# Patient Record
Sex: MALE | Race: WHITE | NOT HISPANIC OR LATINO | Employment: FULL TIME | URBAN - METROPOLITAN AREA
[De-identification: names, ages, dates, MRNs, and addresses within clinical notes are randomized per-mention and may not be internally consistent; named-entity substitution may affect disease eponyms.]

---

## 2017-09-26 ENCOUNTER — OFFICE VISIT (OUTPATIENT)
Dept: FAMILY MEDICINE CLINIC | Facility: CLINIC | Age: 37
End: 2017-09-26

## 2017-09-26 VITALS
OXYGEN SATURATION: 97 % | HEIGHT: 73 IN | SYSTOLIC BLOOD PRESSURE: 118 MMHG | BODY MASS INDEX: 37.67 KG/M2 | HEART RATE: 91 BPM | DIASTOLIC BLOOD PRESSURE: 84 MMHG | TEMPERATURE: 98.2 F | WEIGHT: 284.2 LBS

## 2017-09-26 DIAGNOSIS — Z00.00 ROUTINE ADULT HEALTH MAINTENANCE: ICD-10-CM

## 2017-09-26 DIAGNOSIS — B35.1 FUNGAL INFECTION OF NAIL: ICD-10-CM

## 2017-09-26 DIAGNOSIS — J30.1 NON-SEASONAL ALLERGIC RHINITIS DUE TO POLLEN: Primary | ICD-10-CM

## 2017-09-26 DIAGNOSIS — J06.9 ACUTE URI: ICD-10-CM

## 2017-09-26 PROBLEM — F41.9 ANXIETY DISORDER: Status: ACTIVE | Noted: 2017-09-26

## 2017-09-26 PROBLEM — F41.0 PANIC ATTACK: Status: ACTIVE | Noted: 2017-09-26

## 2017-09-26 PROCEDURE — 99214 OFFICE O/P EST MOD 30 MIN: CPT | Performed by: FAMILY MEDICINE

## 2017-09-26 RX ORDER — LORATADINE 10 MG/1
CAPSULE, LIQUID FILLED ORAL
COMMUNITY
End: 2022-04-11

## 2017-09-26 RX ORDER — MOMETASONE FUROATE 50 UG/1
1 SPRAY, METERED NASAL 2 TIMES DAILY
Qty: 1 EACH | Refills: 5 | Status: SHIPPED | OUTPATIENT
Start: 2017-09-26 | End: 2018-02-12

## 2017-09-26 RX ORDER — TERBINAFINE HYDROCHLORIDE 250 MG/1
250 TABLET ORAL DAILY
Qty: 30 TABLET | Refills: 2 | Status: SHIPPED | OUTPATIENT
Start: 2017-09-26 | End: 2018-01-17

## 2017-09-26 RX ORDER — AMOXICILLIN 500 MG/1
500 TABLET, FILM COATED ORAL 3 TIMES DAILY
Qty: 21 TABLET | Refills: 0 | Status: SHIPPED | OUTPATIENT
Start: 2017-09-26 | End: 2017-10-03

## 2017-09-26 NOTE — PROGRESS NOTES
"  Chief Complaint   Patient presents with   • Cough     x 3 weeks    • Wheezing   • Nasal Congestion   • Allergies   • Nail Problem       Upper Respiratory Infection: Patient complains of symptoms of a URI. Symptoms include congestion and cough. Onset of symptoms was a few weeks ago, unchanged since that time. He also c/o congestion, nasal congestion, non productive cough, sinus pressure, sneezing and wheezing for the past 2 weeks .  He is drinking plenty of fluids. Evaluation to date: none. Treatment to date: cough suppressants.  Ill contacts at home or school or work discussed.    Overall allergies are worse , past few years.  Would like a referral to Allergy.    Bilateral thumb nail , thinning.  Lamisil in past helped only some  OTC topicals some help.      Would like routine labs checked    Vitals:    09/26/17 1553   BP: 118/84   BP Location: Left arm   Patient Position: Sitting   Pulse: 91   Temp: 98.2 °F (36.8 °C)   SpO2: 97%   Weight: 284 lb 3.2 oz (129 kg)   Height: 72.5\" (184.2 cm)     Gen: well appearing, alert  Ears: Tm's bulging without redness  Nose:  Congestion  Throat:   without exudate, some drainage, tonsils okay  Neck: no LAD  Lung: mild rales, good air movement, regular RR  Heart: RR without murmur  Skin: no rash.  Nails; thumbs thin, wavy      Assessment/Plan   Jose was seen today for cough, wheezing, nasal congestion, allergies and nail problem.    Diagnoses and all orders for this visit:    Non-seasonal allergic rhinitis due to pollen  -     mometasone (NASONEX) 50 MCG/ACT nasal spray; 1 spray into each nostril 2 (Two) Times a Day. Indications: Hayfever  -     Ambulatory Referral to Allergy    Acute URI  -     amoxicillin (AMOXIL) 500 MG tablet; Take 1 tablet by mouth 3 (Three) Times a Day for 7 days.    Routine adult health maintenance  -     Lipid Panel; Future  -     CBC (No Diff); Future  -     Comprehensive Metabolic Panel; Future    Fungal infection of nail  -     Efinaconazole " (JUBLIA) 10 % solution; Apply 1 Units topically Daily.    Other orders  -     terbinafine (LAMISIL) 250 MG tablet; Take 1 tablet by mouth Daily. Indications: Fungal Fingernail Disease             There are no Patient Instructions on file for this visit.    Tylenol or Advil as needed for pain, fever, muscle aches  Plenty of fluids  Hand washing discussed  Off work or school note given if needed.  Warm tea for throat.  Pros and cons of antibiotic use discussed    Dr. Jose Pham MD  [place], [place]  [facility]

## 2017-09-27 ENCOUNTER — TELEPHONE (OUTPATIENT)
Dept: FAMILY MEDICINE CLINIC | Facility: CLINIC | Age: 37
End: 2017-09-27

## 2017-09-27 NOTE — TELEPHONE ENCOUNTER
Elías called and said that they need more specific instructions on the Jublia. How many drops to apply?

## 2017-10-01 ENCOUNTER — RESULTS ENCOUNTER (OUTPATIENT)
Dept: FAMILY MEDICINE CLINIC | Facility: CLINIC | Age: 37
End: 2017-10-01

## 2017-10-01 DIAGNOSIS — Z00.00 ROUTINE ADULT HEALTH MAINTENANCE: ICD-10-CM

## 2017-10-05 LAB
ALBUMIN SERPL-MCNC: 4.5 G/DL (ref 3.5–5.2)
ALBUMIN/GLOB SERPL: 1.9 G/DL
ALP SERPL-CCNC: 89 U/L (ref 40–129)
ALT SERPL-CCNC: 52 U/L (ref 5–41)
AST SERPL-CCNC: 29 U/L (ref 5–40)
BILIRUB SERPL-MCNC: 0.6 MG/DL (ref 0.2–1.2)
BUN SERPL-MCNC: 13 MG/DL (ref 6–20)
BUN/CREAT SERPL: 14.1 (ref 7–25)
CALCIUM SERPL-MCNC: 9.2 MG/DL (ref 8.6–10.5)
CHLORIDE SERPL-SCNC: 103 MMOL/L (ref 98–107)
CHOLEST SERPL-MCNC: 171 MG/DL (ref 0–200)
CO2 SERPL-SCNC: 23.6 MMOL/L (ref 22–29)
CREAT SERPL-MCNC: 0.92 MG/DL (ref 0.76–1.27)
ERYTHROCYTE [DISTWIDTH] IN BLOOD BY AUTOMATED COUNT: 13.2 % (ref 11.5–14.5)
GLOBULIN SER CALC-MCNC: 2.4 GM/DL
GLUCOSE SERPL-MCNC: 92 MG/DL (ref 65–99)
HCT VFR BLD AUTO: 44.7 % (ref 42–52)
HDLC SERPL-MCNC: 50 MG/DL (ref 40–60)
HGB BLD-MCNC: 15.5 G/DL (ref 14–18)
LDLC SERPL CALC-MCNC: 86 MG/DL (ref 0–100)
MCH RBC QN AUTO: 31.3 PG (ref 27–31)
MCHC RBC AUTO-ENTMCNC: 34.7 G/DL (ref 31–37)
MCV RBC AUTO: 90.3 FL (ref 80–94)
PLATELET # BLD AUTO: 345 10*3/MM3 (ref 140–500)
POTASSIUM SERPL-SCNC: 4.9 MMOL/L (ref 3.5–5.2)
PROT SERPL-MCNC: 6.9 G/DL (ref 6–8.5)
RBC # BLD AUTO: 4.95 10*6/MM3 (ref 4.7–6.1)
SODIUM SERPL-SCNC: 140 MMOL/L (ref 136–145)
TRIGL SERPL-MCNC: 175 MG/DL (ref 0–150)
VLDLC SERPL CALC-MCNC: 35 MG/DL (ref 8–32)
WBC # BLD AUTO: 9.36 10*3/MM3 (ref 4.8–10.8)

## 2018-01-17 ENCOUNTER — TELEPHONE (OUTPATIENT)
Dept: FAMILY MEDICINE CLINIC | Facility: CLINIC | Age: 38
End: 2018-01-17

## 2018-01-17 RX ORDER — ITRACONAZOLE 100 MG/1
200 CAPSULE ORAL DAILY
Qty: 60 CAPSULE | Refills: 2 | Status: SHIPPED | OUTPATIENT
Start: 2018-01-17 | End: 2018-01-18

## 2018-01-18 RX ORDER — KETOCONAZOLE 200 MG/1
200 TABLET ORAL DAILY
Qty: 30 TABLET | Refills: 2 | Status: SHIPPED | OUTPATIENT
Start: 2018-01-18 | End: 2018-02-12 | Stop reason: SDUPTHER

## 2018-02-12 ENCOUNTER — OFFICE VISIT (OUTPATIENT)
Dept: FAMILY MEDICINE CLINIC | Facility: CLINIC | Age: 38
End: 2018-02-12

## 2018-02-12 VITALS
SYSTOLIC BLOOD PRESSURE: 132 MMHG | HEART RATE: 100 BPM | HEIGHT: 73 IN | TEMPERATURE: 98.1 F | DIASTOLIC BLOOD PRESSURE: 86 MMHG | BODY MASS INDEX: 38.97 KG/M2 | WEIGHT: 294 LBS | OXYGEN SATURATION: 98 %

## 2018-02-12 DIAGNOSIS — R09.1 PLEURISY: Primary | ICD-10-CM

## 2018-02-12 DIAGNOSIS — B35.1 FUNGAL INFECTION OF NAIL: ICD-10-CM

## 2018-02-12 PROCEDURE — 99213 OFFICE O/P EST LOW 20 MIN: CPT | Performed by: FAMILY MEDICINE

## 2018-02-12 RX ORDER — KETOCONAZOLE 200 MG/1
200 TABLET ORAL DAILY
Qty: 30 TABLET | Refills: 1 | Status: SHIPPED | OUTPATIENT
Start: 2018-02-12 | End: 2022-04-11

## 2018-02-12 RX ORDER — PREDNISONE 10 MG/1
10 TABLET ORAL 2 TIMES DAILY
Qty: 14 TABLET | Refills: 0 | Status: SHIPPED | OUTPATIENT
Start: 2018-02-12 | End: 2018-04-24

## 2018-02-12 RX ORDER — AMOXICILLIN 500 MG/1
500 TABLET, FILM COATED ORAL 3 TIMES DAILY
Qty: 21 TABLET | Refills: 0 | Status: SHIPPED | OUTPATIENT
Start: 2018-02-12 | End: 2018-02-19

## 2018-02-12 NOTE — PROGRESS NOTES
"  Chief Complaint   Patient presents with   • Rib Pain     worse lying down x 1 week    • Wheezing   • Cough         Upper Respiratory Infection: Patient complains of follow up on a URI. Symptoms include congestion, cough and bilateral lateral rib pain. Onset of symptoms was a few weeks ago, gradually worsening since that time. He also c/o productive cough with  yellow colored sputum for the past 3 weeks .  He is drinking plenty of fluids. Evaluation to date: none. Treatment to date: none.  Ihad flu 1 month ago, still not quite over it.  Now ribs hurt with cough, deep breath, or direct pressure.  Painful cough at night, hard to sleep    Also still with thin , thumb nails with ridges down the middle  The Lamisil, Jublia, and Nizoral have helped with the yellow color but the nail is still wavy in texture.      Vitals:    02/12/18 1335   BP: 132/86   BP Location: Left arm   Patient Position: Sitting   Pulse: 100   Temp: 98.1 °F (36.7 °C)   SpO2: 98%   Weight: 133 kg (294 lb)   Height: 184.2 cm (72.5\")     Gen: mildly ill appearing, alert  Ears: Tm's bulging without redness  Nose:  Congestion  Throat:  Red without exudate, some drainage, tonsils okay  Neck: no LAD  Lung: mild crackles in right post base good air movement, regular RR  Heart: RR without murmur  Skin: no rash.  Thumb nails, the center plates are ridged from cuticle to tip,       Assessment/Plan   Jose was seen today for rib pain, wheezing and cough.    Diagnoses and all orders for this visit:    Pleurisy  -     amoxicillin (AMOXIL) 500 MG tablet; Take 1 tablet by mouth 3 (Three) Times a Day for 7 days.  -     predniSONE (DELTASONE) 10 MG tablet; Take 1 tablet by mouth 2 (Two) Times a Day.  -     HYDROcodone-homatropine (HYCODAN) 5-1.5 MG/5ML syrup; Take 5 mL by mouth Every 8 (Eight) Hours As Needed for Cough.    Fungal infection of nail  -     ketoconazole (NIZORAL) 200 MG tablet; Take 1 tablet by mouth Daily.      Will try a few more months of Nizoral, " but the nail bed/cuticle is damaged and will never produce a smooth nail surface.       There are no Patient Instructions on file for this visit.    Tylenol or Advil as needed for pain, fever, muscle aches  Plenty of fluids  Hand washing discussed  Off work or school note given if needed.  Warm tea for throat.  Pros and cons of antibiotic use discussed    Dr. Jose Pham MD  Family [place], [place]  [facility]

## 2018-02-13 ENCOUNTER — TELEPHONE (OUTPATIENT)
Dept: FAMILY MEDICINE CLINIC | Facility: CLINIC | Age: 38
End: 2018-02-13

## 2018-02-13 NOTE — TELEPHONE ENCOUNTER
Drink a Red Bull  Hit the gym 5 x a week  Reduce calories to 2000 a day.  Drink 1 gal of water a day.

## 2018-02-13 NOTE — TELEPHONE ENCOUNTER
Pt called and said he forgot to ask you yesterday about getting on an appetite suppressant. He said he is getting too big and wants to know what you think of him being on something like that.

## 2018-02-19 ENCOUNTER — TELEPHONE (OUTPATIENT)
Dept: FAMILY MEDICINE CLINIC | Facility: CLINIC | Age: 38
End: 2018-02-19

## 2018-02-19 DIAGNOSIS — R09.1 PLEURISY: Primary | ICD-10-CM

## 2018-02-19 RX ORDER — IBUPROFEN 800 MG/1
800 TABLET ORAL 3 TIMES DAILY PRN
Qty: 60 TABLET | Refills: 0 | Status: SHIPPED | OUTPATIENT
Start: 2018-02-19 | End: 2018-03-21

## 2018-02-19 NOTE — TELEPHONE ENCOUNTER
Pt called and said he would like a different medication other than the steroid we RX him. He said its causing him to have some major mood swings. He mentioned an anti inflammatory.        Ok stop the prednisone for the pleurisy  I sent in motrin 800 tid    Jose Pham MD

## 2018-03-05 ENCOUNTER — TELEPHONE (OUTPATIENT)
Dept: FAMILY MEDICINE CLINIC | Facility: CLINIC | Age: 38
End: 2018-03-05

## 2018-03-05 DIAGNOSIS — R09.1 PLEURISY: Primary | ICD-10-CM

## 2018-03-05 RX ORDER — HYDROCODONE BITARTRATE AND ACETAMINOPHEN 5; 325 MG/1; MG/1
1 TABLET ORAL EVERY 4 HOURS PRN
Qty: 18 TABLET | Refills: 0 | Status: SHIPPED | OUTPATIENT
Start: 2018-03-05 | End: 2018-04-24

## 2018-03-05 NOTE — TELEPHONE ENCOUNTER
Pt called and said that his chest has felt better recently but yesterday he had a pretty violent sneeze and now he is worse. He said the pain is in his right side.

## 2018-04-24 ENCOUNTER — OFFICE VISIT (OUTPATIENT)
Dept: FAMILY MEDICINE CLINIC | Facility: CLINIC | Age: 38
End: 2018-04-24

## 2018-04-24 VITALS
BODY MASS INDEX: 39.49 KG/M2 | TEMPERATURE: 98 F | DIASTOLIC BLOOD PRESSURE: 90 MMHG | SYSTOLIC BLOOD PRESSURE: 126 MMHG | HEART RATE: 96 BPM | HEIGHT: 73 IN | OXYGEN SATURATION: 97 % | WEIGHT: 298 LBS

## 2018-04-24 DIAGNOSIS — G47.33 OBSTRUCTIVE SLEEP APNEA SYNDROME: Primary | ICD-10-CM

## 2018-04-24 DIAGNOSIS — IMO0001 CLASS 3 OBESITY DUE TO EXCESS CALORIES WITH SERIOUS COMORBIDITY AND BODY MASS INDEX (BMI) OF 40.0 TO 44.9 IN ADULT: ICD-10-CM

## 2018-04-24 PROCEDURE — 99213 OFFICE O/P EST LOW 20 MIN: CPT | Performed by: FAMILY MEDICINE

## 2018-04-24 RX ORDER — PHENTERMINE HYDROCHLORIDE 37.5 MG/1
37.5 CAPSULE ORAL EVERY MORNING
Qty: 30 CAPSULE | Refills: 0 | Status: SHIPPED | OUTPATIENT
Start: 2018-04-24 | End: 2018-05-31 | Stop reason: SDUPTHER

## 2018-04-24 NOTE — PROGRESS NOTES
Subjective   Jose Orta is a 37 y.o. male who is here for   Chief Complaint   Patient presents with   • Can't Focus     trouble at work   .     History of Present Illness   Sleep Apnea: He presents for a sleep evaluation. He complains of snoring, snorting, choking, periods of not breathing, tossing and turning, kicking, decreased memory, decreased concentration, decreased sexual drive, excessive daytime sleepiness, sinus problems, congested nose, difficulty falling asleep once awakened, feels sleepy during the day, take naps during the day.  Symptoms began 6 months ago, gradually worsening since that time. He goes to sleep at 10 weekdays and 11 weekends. He awakens 8am weekdays and 8 weekends. He falls asleep in 5 minutes.  Collar size 18. He denies uncomfortable bedding. Previous evaluation and treatment has included none.      The following portions of the patient's history were reviewed and updated as appropriate: allergies, current medications, past family history, past medical history, past social history, past surgical history and problem list.    Review of Systems    Objective   Physical Exam   Constitutional: He appears well-developed and well-nourished.   Cardiovascular: Normal rate.    Pulmonary/Chest: Effort normal.   Nursing note and vitals reviewed.      Assessment/Plan   Jose was seen today for can't focus.    Diagnoses and all orders for this visit:    Obstructive sleep apnea syndrome  -     Ambulatory Referral to Sleep Medicine    Class 3 obesity due to excess calories with serious comorbidity and body mass index (BMI) of 40.0 to 44.9 in adult  -     phentermine 37.5 MG capsule; Take 1 capsule by mouth Every Morning.      There are no Patient Instructions on file for this visit.    Medications Discontinued During This Encounter   Medication Reason   • HYDROcodone-homatropine (HYCODAN) 5-1.5 MG/5ML syrup *Therapy completed   • HYDROcodone-acetaminophen (NORCO) 5-325 MG per tablet *Therapy  completed   • Efinaconazole (JUBLIA) 10 % solution *Therapy completed   • predniSONE (DELTASONE) 10 MG tablet *Therapy completed        Return in about 1 month (around 5/24/2018) for new medication follow up.    Dr. Jose Pham  [place], [place]

## 2018-05-31 DIAGNOSIS — IMO0001 CLASS 3 OBESITY DUE TO EXCESS CALORIES WITH SERIOUS COMORBIDITY AND BODY MASS INDEX (BMI) OF 40.0 TO 44.9 IN ADULT: ICD-10-CM

## 2018-05-31 RX ORDER — PHENTERMINE HYDROCHLORIDE 37.5 MG/1
37.5 CAPSULE ORAL EVERY MORNING
Qty: 30 CAPSULE | Refills: 0 | OUTPATIENT
Start: 2018-05-31 | End: 2019-08-09

## 2019-08-09 ENCOUNTER — OFFICE VISIT (OUTPATIENT)
Dept: FAMILY MEDICINE CLINIC | Facility: CLINIC | Age: 39
End: 2019-08-09

## 2019-08-09 VITALS
OXYGEN SATURATION: 98 % | RESPIRATION RATE: 16 BRPM | SYSTOLIC BLOOD PRESSURE: 128 MMHG | BODY MASS INDEX: 41.32 KG/M2 | WEIGHT: 311.8 LBS | HEART RATE: 88 BPM | DIASTOLIC BLOOD PRESSURE: 88 MMHG | HEIGHT: 73 IN

## 2019-08-09 DIAGNOSIS — E66.01 CLASS 3 SEVERE OBESITY DUE TO EXCESS CALORIES WITH SERIOUS COMORBIDITY AND BODY MASS INDEX (BMI) OF 40.0 TO 44.9 IN ADULT (HCC): Primary | ICD-10-CM

## 2019-08-09 PROBLEM — E66.813 CLASS 3 SEVERE OBESITY DUE TO EXCESS CALORIES WITH SERIOUS COMORBIDITY AND BODY MASS INDEX (BMI) OF 40.0 TO 44.9 IN ADULT: Status: ACTIVE | Noted: 2018-04-24

## 2019-08-09 PROCEDURE — 99213 OFFICE O/P EST LOW 20 MIN: CPT | Performed by: FAMILY MEDICINE

## 2019-08-09 RX ORDER — PHENTERMINE HYDROCHLORIDE 37.5 MG/1
37.5 CAPSULE ORAL EVERY MORNING
Qty: 30 CAPSULE | Refills: 0 | Status: SHIPPED | OUTPATIENT
Start: 2019-08-09 | End: 2019-09-13 | Stop reason: SDUPTHER

## 2019-08-09 NOTE — PROGRESS NOTES
Subjective   Jose Orta is a 38 y.o. male who is here for   Chief Complaint   Patient presents with   • Fatigue   • Obesity     weight is causing back issues, rib pain-would like to get back on phentermine    .     History of Present Illness   Fatigue: Patient complains of fatigue. Symptoms began several months ago. Sentinal symptom the patient feels fatigue began with: significant change in weight and exercise intolerance. Symptoms of his fatigue have been diffuse soft tissue aches and pains, fatigue with paradoxical insomnia, feelings of depression, general malaise, lack of interest in usual activities and poor athletic performance. Patient describes the following psychologic symptoms: stress at work.  Patient denies fever and GI blood loss. Symptoms have gradually worsened. Severity has been symptoms bothersome, but easily able to carry out all usual work/school/family activities. Previous visits for this problem: multiple, this is a longstanding diagnosis. Last seen a few months ago by me   Has re gained all his weight.       The following portions of the patient's history were reviewed and updated as appropriate: allergies, current medications, past family history, past medical history, past social history, past surgical history and problem list.    Review of Systems    Objective   Physical Exam   Constitutional: He appears well-developed and well-nourished.   Cardiovascular: Normal rate.   Pulmonary/Chest: Effort normal.   Neurological: He is alert.   Psychiatric: He has a normal mood and affect.   Nursing note and vitals reviewed.      Assessment/Plan   Jose was seen today for fatigue and obesity.    Diagnoses and all orders for this visit:    Class 3 severe obesity due to excess calories with serious comorbidity and body mass index (BMI) of 40.0 to 44.9 in adult (CMS/[facility])  -     naltrexone-bupropion ER (CONTRAVE) 8-90 MG tablet; Wk 1: 1 tab daily, Wk 2: 1 tab twice a day, Wk 3: 2 tabs in AM, 1 tab  in PM, Wk 4: 2 tabs twice a day, Maintenance dose: 2 tabs twice daily.  -     phentermine 37.5 MG capsule; Take 1 capsule by mouth Every Morning.    start adipex now  Restart working out at [employer]  Transition over to App [employer] in 1 month ( safer for long term use and will help with his binge eating)    There are no Patient Instructions on file for this visit.    Medications Discontinued During This Encounter   Medication Reason   • phentermine 37.5 MG capsule *Therapy completed   • phentermine 37.5 MG capsule *Therapy completed        Return in about 2 months (around 10/9/2019) for new medication follow up.    Dr. Jose Pham  [place], [place]

## 2019-09-12 ENCOUNTER — TELEPHONE (OUTPATIENT)
Dept: FAMILY MEDICINE CLINIC | Facility: CLINIC | Age: 39
End: 2019-09-12

## 2019-09-12 DIAGNOSIS — E66.01 CLASS 3 SEVERE OBESITY DUE TO EXCESS CALORIES WITH SERIOUS COMORBIDITY AND BODY MASS INDEX (BMI) OF 40.0 TO 44.9 IN ADULT (HCC): ICD-10-CM

## 2019-09-12 NOTE — TELEPHONE ENCOUNTER
Patient says that Contrave makes him feel sick, brain foggy and in a bad mood.  He said Phentermine lost its effectiveness.  Is there something else he can try?

## 2019-09-13 RX ORDER — PHENTERMINE HYDROCHLORIDE 37.5 MG/1
37.5 CAPSULE ORAL EVERY MORNING
Qty: 30 CAPSULE | Refills: 0 | Status: SHIPPED | OUTPATIENT
Start: 2019-09-13 | End: 2020-04-22 | Stop reason: SDUPTHER

## 2019-09-13 NOTE — TELEPHONE ENCOUNTER
Is going to stop contrave, wondering if he can restart phentermine again?    Ok  Stop Contrave  Adipex sent in    Jose Pham MD

## 2020-04-21 ENCOUNTER — TELEPHONE (OUTPATIENT)
Dept: FAMILY MEDICINE CLINIC | Facility: CLINIC | Age: 40
End: 2020-04-21

## 2020-04-21 DIAGNOSIS — E66.01 CLASS 3 SEVERE OBESITY DUE TO EXCESS CALORIES WITH SERIOUS COMORBIDITY AND BODY MASS INDEX (BMI) OF 40.0 TO 44.9 IN ADULT (HCC): ICD-10-CM

## 2020-04-21 RX ORDER — PHENTERMINE HYDROCHLORIDE 37.5 MG/1
37.5 CAPSULE ORAL EVERY MORNING
Qty: 30 CAPSULE | Refills: 0 | Status: CANCELLED | OUTPATIENT
Start: 2020-04-21

## 2020-04-22 ENCOUNTER — OFFICE VISIT (OUTPATIENT)
Dept: FAMILY MEDICINE CLINIC | Facility: CLINIC | Age: 40
End: 2020-04-22

## 2020-04-22 DIAGNOSIS — E66.01 CLASS 3 SEVERE OBESITY DUE TO EXCESS CALORIES WITH SERIOUS COMORBIDITY AND BODY MASS INDEX (BMI) OF 40.0 TO 44.9 IN ADULT (HCC): Primary | ICD-10-CM

## 2020-04-22 PROCEDURE — 99213 OFFICE O/P EST LOW 20 MIN: CPT | Performed by: FAMILY MEDICINE

## 2020-04-22 RX ORDER — PHENTERMINE HYDROCHLORIDE 37.5 MG/1
37.5 CAPSULE ORAL EVERY MORNING
Qty: 30 CAPSULE | Refills: 0 | Status: SHIPPED | OUTPATIENT
Start: 2020-04-22 | End: 2020-05-19 | Stop reason: SDUPTHER

## 2020-04-22 NOTE — PROGRESS NOTES
Subjective   Jose Orta is a 39 y.o. male who is here for   Chief Complaint   Patient presents with   • Fatigue   • Obesity   .     History of Present Illness   You have chosen to receive care through a telephone visit. Do you consent to use a telephone visit for your medical care today? Yes.  5 min call today    Current weigh on home scale 308 lb.  Contrave did not work due to nausea  Adipex works some at least for a few weeks.  Has ordered a punching and a speed bag  Working from home, now , [employer] business.        The following portions of the patient's history were reviewed and updated as appropriate: allergies, current medications, past family history, past medical history, past social history, past surgical history and problem list.    Review of Systems   Constitutional: Negative.        Objective   Physical Exam    Assessment/Plan   Jose was seen today for fatigue and obesity.    Diagnoses and all orders for this visit:    Class 3 severe obesity due to excess calories with serious comorbidity and body mass index (BMI) of 40.0 to 44.9 in adult (CMS/[facility])  -     phentermine 37.5 MG capsule; Take 1 capsule by mouth Every Morning.      There are no Patient Instructions on file for this visit.    Medications Discontinued During This Encounter   Medication Reason   • naltrexone-bupropion ER (CONTRAVE) 8-90 MG tablet Side effects   • phentermine 37.5 MG capsule Reorder        Return if symptoms worsen or fail to improve.    Dr. Jose Pham  [place], [place]

## 2020-05-19 DIAGNOSIS — E66.01 CLASS 3 SEVERE OBESITY DUE TO EXCESS CALORIES WITH SERIOUS COMORBIDITY AND BODY MASS INDEX (BMI) OF 40.0 TO 44.9 IN ADULT (HCC): ICD-10-CM

## 2020-05-19 RX ORDER — PHENTERMINE HYDROCHLORIDE 37.5 MG/1
37.5 CAPSULE ORAL EVERY MORNING
Qty: 30 CAPSULE | Refills: 0 | Status: SHIPPED | OUTPATIENT
Start: 2020-05-19 | End: 2020-08-14 | Stop reason: SDUPTHER

## 2020-05-19 NOTE — TELEPHONE ENCOUNTER
PATIENT CALLED REQUESTING A REFILL ON HIS phentermine 37.5 MG capsule. I CONFIRMED THE [facility].

## 2020-07-08 ENCOUNTER — TELEPHONE (OUTPATIENT)
Dept: FAMILY MEDICINE CLINIC | Facility: CLINIC | Age: 40
End: 2020-07-08

## 2020-07-08 NOTE — TELEPHONE ENCOUNTER
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     PATIENT ADMITS HE IS \"LARGER\" BUT HE IS TRYING TO NOT HAVE TO SIT ALL THE TIME.     PATIENT CALL BACK NUMBER  [phone]     PATIENT ALSO MAY ALSO SEND A MESSAGE THROUGH MY CHART ASKING THE SAME QUESTION.         "

## 2020-07-09 ENCOUNTER — DOCUMENTATION (OUTPATIENT)
Dept: FAMILY MEDICINE CLINIC | Facility: CLINIC | Age: 40
End: 2020-07-09

## 2020-08-06 ENCOUNTER — TELEPHONE (OUTPATIENT)
Dept: FAMILY MEDICINE CLINIC | Facility: CLINIC | Age: 40
End: 2020-08-06

## 2020-08-06 NOTE — TELEPHONE ENCOUNTER
Patient called in stating he is having intense lower right side abdominal pain. He stated he was delirious and in extreme discomfort. He stated that he is unable to move or do anything without a pain in his side. He believes it his appendix. Advised patient that if he was in severe pain and unable to move that he should go to ED. Patient expressed understanding and concern.

## 2020-08-14 DIAGNOSIS — E66.01 CLASS 3 SEVERE OBESITY DUE TO EXCESS CALORIES WITH SERIOUS COMORBIDITY AND BODY MASS INDEX (BMI) OF 40.0 TO 44.9 IN ADULT (HCC): ICD-10-CM

## 2020-08-14 RX ORDER — PHENTERMINE HYDROCHLORIDE 37.5 MG/1
37.5 CAPSULE ORAL EVERY MORNING
Qty: 30 CAPSULE | Refills: 0 | Status: SHIPPED | OUTPATIENT
Start: 2020-08-14 | End: 2020-11-24

## 2020-08-14 NOTE — TELEPHONE ENCOUNTER
Do you need to see pt in office for this medicine?    I will go ahead and fill  But he is currently recovering from appendicitis surgery  So advise him to hold off on restarting this until September  Then lets have him see me in October for in office refill

## 2020-11-09 ENCOUNTER — TELEPHONE (OUTPATIENT)
Dept: FAMILY MEDICINE CLINIC | Facility: CLINIC | Age: 40
End: 2020-11-09

## 2020-11-09 NOTE — TELEPHONE ENCOUNTER
Caller: Jose Orta    Relationship to patient: Self    Best call back number: [phone]     New or established patient?  [] New  [x] Established    Date of discharge: EXPECTED TO DISCHARGE 11-28-20     Facility discharged from: [facility] [place]     Diagnosis/Symptoms: ALCOHOL ABUSE     Length of stay (If applicable): 10-31-20 UNTIL 11-28-20     Specialty Only: Did you see a [employer] health provider?    [] Yes  [x] No  If so, who? [facility]     PATIENT IS SCHEDULED WITH PCP ON 12-1-20

## 2020-11-19 ENCOUNTER — READMISSION MANAGEMENT (OUTPATIENT)
Dept: CALL CENTER | Facility: HOSPITAL | Age: 40
End: 2020-11-19

## 2020-11-19 NOTE — OUTREACH NOTE
Prep Survey      Responses   [facility] facility patient discharged from?  Non-BH   Is LACE score < 7 ?  Non-BH Discharge   Eligibility  [facility] [place]   Date of Admission  10/31/20   Date of Discharge  11/28/20   Discharge diagnosis  ETOH abuse   Does the patient have one of the following disease processes/diagnoses(primary or secondary)?  Other   Prep survey completed?  Yes          Lida Reyna RN

## 2020-11-24 ENCOUNTER — OFFICE VISIT (OUTPATIENT)
Dept: FAMILY MEDICINE CLINIC | Facility: CLINIC | Age: 40
End: 2020-11-24

## 2020-11-24 VITALS
BODY MASS INDEX: 41.23 KG/M2 | SYSTOLIC BLOOD PRESSURE: 120 MMHG | WEIGHT: 311.1 LBS | HEART RATE: 97 BPM | DIASTOLIC BLOOD PRESSURE: 82 MMHG | OXYGEN SATURATION: 99 % | HEIGHT: 73 IN

## 2020-11-24 DIAGNOSIS — R53.82 CHRONIC FATIGUE: Primary | ICD-10-CM

## 2020-11-24 PROBLEM — R09.1 PLEURISY: Status: RESOLVED | Noted: 2018-02-12 | Resolved: 2020-11-24

## 2020-11-24 PROBLEM — F10.10 ETOH ABUSE: Status: ACTIVE | Noted: 2020-11-24

## 2020-11-24 PROBLEM — Z90.49 S/P LAPAROSCOPIC APPENDECTOMY: Status: ACTIVE | Noted: 2020-08-06

## 2020-11-24 PROCEDURE — 99213 OFFICE O/P EST LOW 20 MIN: CPT | Performed by: FAMILY MEDICINE

## 2020-11-24 RX ORDER — NALTREXONE HYDROCHLORIDE 50 MG/1
TABLET, FILM COATED ORAL
COMMUNITY
Start: 2020-11-11 | End: 2022-04-11

## 2020-11-24 NOTE — PROGRESS NOTES
Subjective   Jose Orta is a 40 y.o. male who is here for   Chief Complaint   Patient presents with   • Back Pain     pulled muscle in back yesterday / lower and middle    • Fatigue     would like to start testosterone    .     History of Present Illness   Mr. Orta comes in today after attending alcohol rehab.  He went into an inpatient facility done in [facility].  He admits he has been drinking till drunkenness every day of his life since his early 20s so approximately 20 years.  He had his appendix out back in August.  Had a very hard time not drinking while he was in the hospital.  He and his wife decided to put him in a rehab.  He has been now been sober for almost 2 weeks.  Feeling better he still trying to find a good AA group to join.    He like to have his testosterone and fatigue labs checked as he is been battling his obesity his whole life.      The following portions of the patient's history were reviewed and updated as appropriate: allergies, current medications, past family history, past medical history, past social history, past surgical history and problem list.    Review of Systems    Objective   Physical Exam  Vitals signs reviewed.   Cardiovascular:      Rate and Rhythm: Normal rate.   Pulmonary:      Effort: Pulmonary effort is normal.   Neurological:      Mental Status: He is alert.   Psychiatric:         Mood and Affect: Mood normal.         Assessment/Plan   Diagnoses and all orders for this visit:    1. Chronic fatigue (Primary)  -     Testosterone, Free, Total  -     TSH  -     Hemoglobin A1c  -     Follicle Stimulating Hormone  -     Luteinizing Hormone      There are no Patient Instructions on file for this visit.    Medications Discontinued During This Encounter   Medication Reason   • phentermine 37.5 MG capsule *Therapy completed        No follow-ups on file.    Dr. Jose Pham  [facility] [place], [place]

## 2020-11-26 LAB
FSH SERPL-ACNC: 3.7 MIU/ML (ref 1.5–12.4)
HBA1C MFR BLD: 5.4 % (ref 4.8–5.6)
LH SERPL-ACNC: 3 MIU/ML (ref 1.7–8.6)
TESTOST FREE SERPL-MCNC: 5.5 PG/ML (ref 6.8–21.5)
TESTOST SERPL-MCNC: 126 NG/DL (ref 264–916)
TSH SERPL DL<=0.005 MIU/L-ACNC: 3.43 UIU/ML (ref 0.27–4.2)

## 2020-11-30 ENCOUNTER — TRANSITIONAL CARE MANAGEMENT TELEPHONE ENCOUNTER (OUTPATIENT)
Dept: CALL CENTER | Facility: HOSPITAL | Age: 40
End: 2020-11-30

## 2020-11-30 ENCOUNTER — TELEPHONE (OUTPATIENT)
Dept: FAMILY MEDICINE CLINIC | Facility: CLINIC | Age: 40
End: 2020-11-30

## 2020-11-30 ENCOUNTER — OFFICE VISIT (OUTPATIENT)
Dept: FAMILY MEDICINE CLINIC | Facility: CLINIC | Age: 40
End: 2020-11-30

## 2020-11-30 VITALS
DIASTOLIC BLOOD PRESSURE: 78 MMHG | HEIGHT: 73 IN | WEIGHT: 308 LBS | OXYGEN SATURATION: 99 % | HEART RATE: 84 BPM | BODY MASS INDEX: 40.82 KG/M2 | SYSTOLIC BLOOD PRESSURE: 118 MMHG

## 2020-11-30 DIAGNOSIS — E66.01 CLASS 3 SEVERE OBESITY DUE TO EXCESS CALORIES WITH SERIOUS COMORBIDITY AND BODY MASS INDEX (BMI) OF 40.0 TO 44.9 IN ADULT (HCC): ICD-10-CM

## 2020-11-30 DIAGNOSIS — R53.82 CHRONIC FATIGUE: ICD-10-CM

## 2020-11-30 DIAGNOSIS — R79.89 LOW TESTOSTERONE IN MALE: Primary | ICD-10-CM

## 2020-11-30 PROCEDURE — 99213 OFFICE O/P EST LOW 20 MIN: CPT | Performed by: FAMILY MEDICINE

## 2020-11-30 RX ORDER — TESTOSTERONE 40.5 MG/2.5G
40.5 GEL TOPICAL DAILY
Qty: 75 G | Refills: 5 | Status: SHIPPED | OUTPATIENT
Start: 2020-11-30 | End: 2020-12-30

## 2020-11-30 NOTE — PROGRESS NOTES
Subjective   Jose Orta is a 40 y.o. male who is here for   Chief Complaint   Patient presents with   • Follow-up     testosterone lab   .     History of Present Illness   Has low T, see labs below  Really trying to stay sober  Going to the gym 1-2 x  A day.    The following portions of the patient's history were reviewed and updated as appropriate: allergies, current medications, past family history, past medical history, past social history, past surgical history and problem list.    Review of Systems   Constitutional: Positive for activity change, appetite change, fatigue and unexpected weight change.       Objective   Physical Exam  Vitals signs reviewed.   Cardiovascular:      Rate and Rhythm: Normal rate.   Pulmonary:      Effort: Pulmonary effort is normal.   Psychiatric:         Mood and Affect: Mood normal.         Results for orders placed or performed in visit on 11/24/20   Testosterone, Free, Total    Specimen: Blood   Result Value Ref Range    Testosterone, Total 126 (L) 264 - 916 ng/dL    Testosterone, Free 5.5 (L) 6.8 - 21.5 pg/mL   TSH    Specimen: Blood   Result Value Ref Range    TSH 3.430 0.270 - 4.200 uIU/mL   Hemoglobin A1c    Specimen: Blood   Result Value Ref Range    Hemoglobin A1C 5.40 4.80 - 5.60 %   Follicle Stimulating Hormone    Specimen: Blood   Result Value Ref Range    FSH 3.7 1.5 - 12.4 mIU/mL   Luteinizing Hormone    Specimen: Blood   Result Value Ref Range    LH 3.0 1.7 - 8.6 mIU/mL       Assessment/Plan   Diagnoses and all orders for this visit:    1. Low testosterone in male (Primary)  -     Testosterone (AndroGel) 40.5 MG/2.5GM (1.62%) gel; Place 40.5 mg on the skin as directed by provider Daily for 30 days.  Dispense: 75 g; Refill: 5  -     Testosterone, Free, Total; Future  -     Cortisol; Future  -     Luteinizing Hormone; Future  -     Follicle Stimulating Hormone; Future  -     Estrogens, Total; Future    2. Chronic fatigue  -     Testosterone (AndroGel) 40.5  MG/2.5GM (1.62%) gel; Place 40.5 mg on the skin as directed by provider Daily for 30 days.  Dispense: 75 g; Refill: 5  -     Testosterone, Free, Total; Future  -     Cortisol; Future  -     Luteinizing Hormone; Future  -     Follicle Stimulating Hormone; Future  -     Estrogens, Total; Future    3. Class 3 severe obesity due to excess calories with serious comorbidity and body mass index (BMI) of 40.0 to 44.9 in adult (CMS/[facility])  -     Testosterone (AndroGel) 40.5 MG/2.5GM (1.62%) gel; Place 40.5 mg on the skin as directed by provider Daily for 30 days.  Dispense: 75 g; Refill: 5  -     Testosterone, Free, Total; Future  -     Cortisol; Future  -     Luteinizing Hormone; Future  -     Follicle Stimulating Hormone; Future  -     Estrogens, Total; Future    lets start AndroGel today.    There are no Patient Instructions on file for this visit.    There are no discontinued medications.     Return in about 2 months (around 1/30/2021) for new medication follow up.    Dr. Jose Pham  [facility]  [place], [place]

## 2020-11-30 NOTE — OUTREACH NOTE
Call Center TCM Note      Responses   [facility] patient discharged from?  Non-BH   Does the patient have one of the following disease processes/diagnoses(primary or secondary)?  Other   TCM attempt successful?  No   Revoked Reason  Other [Patient has a complete f/u appt.]          Whit Friend RN    11/30/2020, 17:35 EST

## 2020-12-22 ENCOUNTER — TELEPHONE (OUTPATIENT)
Dept: FAMILY MEDICINE CLINIC | Facility: CLINIC | Age: 40
End: 2020-12-22

## 2020-12-22 NOTE — TELEPHONE ENCOUNTER
Caller: Jose Orta    Relationship: Self    Best call back number: [phone]     What medications are you currently taking:   Current Outpatient Medications on File Prior to Visit   Medication Sig Dispense Refill   • ketoconazole (NIZORAL) 200 MG tablet Take 1 tablet by mouth Daily. 30 tablet 1   • Loratadine (CLARITIN) 10 MG capsule Take  by mouth.     • naltrexone (DEPADE) 50 MG tablet      • Testosterone (AndroGel) 40.5 MG/2.5GM (1.62%) gel Place 40.5 mg on the skin as directed by provider Daily for 30 days. 75 g 5     No current facility-administered medications on file prior to visit.         When did you start taking these medications: 1 MONTH    Which medication are you concerned about: TESTOSTERONE     Who prescribed you this medication: DR. ZENDEJAS    What are your concerns:MR. ORTA WOULD LIKE TO KNOW IF   THERE IS ANYWAY TO TEST TO MAKE SURE MEDICATION IS WORKING PROPERLY BEFORE HE IS DUE FOR A REFILL.     How long have you been taking these medications: 1 MONTH

## 2021-01-18 DIAGNOSIS — E66.01 CLASS 3 SEVERE OBESITY DUE TO EXCESS CALORIES WITH SERIOUS COMORBIDITY AND BODY MASS INDEX (BMI) OF 40.0 TO 44.9 IN ADULT (HCC): ICD-10-CM

## 2021-01-18 DIAGNOSIS — R79.89 LOW TESTOSTERONE IN MALE: ICD-10-CM

## 2021-01-18 DIAGNOSIS — R53.82 CHRONIC FATIGUE: ICD-10-CM

## 2021-01-24 LAB
CORTIS SERPL-MCNC: 5.3 UG/DL
ESTROGEN SERPL-MCNC: 115 PG/ML (ref 40–115)
FSH SERPL-ACNC: 0.5 MIU/ML (ref 1.5–12.4)
LH SERPL-ACNC: <0.3 MIU/ML (ref 1.7–8.6)
TESTOST FREE SERPL-MCNC: 6.3 PG/ML (ref 6.8–21.5)
TESTOST SERPL-MCNC: 241 NG/DL (ref 264–916)

## 2021-01-26 ENCOUNTER — OFFICE VISIT (OUTPATIENT)
Dept: FAMILY MEDICINE CLINIC | Facility: CLINIC | Age: 41
End: 2021-01-26

## 2021-01-26 VITALS
WEIGHT: 302.5 LBS | HEIGHT: 73 IN | DIASTOLIC BLOOD PRESSURE: 80 MMHG | OXYGEN SATURATION: 99 % | BODY MASS INDEX: 40.09 KG/M2 | HEART RATE: 86 BPM | SYSTOLIC BLOOD PRESSURE: 120 MMHG

## 2021-01-26 DIAGNOSIS — R79.89 LOW TESTOSTERONE IN MALE: Primary | ICD-10-CM

## 2021-01-26 PROBLEM — Z90.49 S/P LAPAROSCOPIC APPENDECTOMY: Status: RESOLVED | Noted: 2020-08-06 | Resolved: 2021-01-26

## 2021-01-26 PROCEDURE — 99213 OFFICE O/P EST LOW 20 MIN: CPT | Performed by: FAMILY MEDICINE

## 2021-01-26 RX ORDER — TESTOSTERONE 40.5 MG/2.5G
GEL TOPICAL
COMMUNITY
End: 2021-01-26

## 2021-01-26 RX ORDER — NEEDLES, DISPOSABLE 25GX5/8"
1 NEEDLE, DISPOSABLE MISCELLANEOUS
Qty: 25 EACH | Refills: 1 | Status: SHIPPED | OUTPATIENT
Start: 2021-01-26 | End: 2021-02-17 | Stop reason: SDUPTHER

## 2021-01-26 RX ORDER — TESTOSTERONE CYPIONATE 200 MG/ML
200 INJECTION, SOLUTION INTRAMUSCULAR
Qty: 4 ML | Refills: 5 | Status: SHIPPED | OUTPATIENT
Start: 2021-01-26 | End: 2021-07-06 | Stop reason: SDUPTHER

## 2021-01-26 NOTE — PROGRESS NOTES
"  Subjective   Jose Orta is a 40 y.o. male who is here for   Chief Complaint   Patient presents with   • low testosterone      f/u labs - would like to start injection    .     History of Present Illness   T level is still low  Using 2 applications of androl gel a day.  Estrogen is up  FSH and LH are low as well    The following portions of the patient's history were reviewed and updated as appropriate: allergies, current medications, past family history, past medical history, past social history, past surgical history and problem list.    Review of Systems    Objective   Physical Exam  Results for orders placed or performed in visit on 01/18/21   Estrogens, Total    Specimen: Blood   Result Value Ref Range    Estrogen 115 40 - 115 pg/mL   Follicle Stimulating Hormone    Specimen: Blood   Result Value Ref Range    FSH 0.5 (L) 1.5 - 12.4 mIU/mL   Luteinizing Hormone    Specimen: Blood   Result Value Ref Range    LH <0.3 (L) 1.7 - 8.6 mIU/mL   Cortisol    Specimen: Blood   Result Value Ref Range    Cortisol 5.3 ug/dL   Testosterone, Free, Total    Specimen: Blood   Result Value Ref Range    Testosterone, Total 241 (L) 264 - 916 ng/dL    Testosterone, Free 6.3 (L) 6.8 - 21.5 pg/mL       Assessment/Plan   Diagnoses and all orders for this visit:    1. Low testosterone in male (Primary)  -     Testosterone Cypionate (Depo-Testosterone) 200 MG/ML injection; Inject 1 mL into the appropriate muscle as directed by prescriber Every 7 (Seven) Days.  Dispense: 4 mL; Refill: 5  -     Needle, Disp, (BD Disp Needles) 21G X 1-1/2\" misc; Inject 1 Units into the appropriate muscle as directed by prescriber Every 7 (Seven) Days.  Dispense: 25 each; Refill: 1    so we will stop the west gel  And start depo T and 200 mg q weekly.    There are no Patient Instructions on file for this visit.    Medications Discontinued During This Encounter   Medication Reason   • Testosterone 40.5 MG/2.5GM (1.62%) gel Not Efficacious        Return " in about 2 months (around 3/26/2021).    Dr. Jose Phma  [place], [place]

## 2021-01-27 ENCOUNTER — CLINICAL SUPPORT (OUTPATIENT)
Dept: FAMILY MEDICINE CLINIC | Facility: CLINIC | Age: 41
End: 2021-01-27

## 2021-01-27 DIAGNOSIS — R79.89 LOW TESTOSTERONE IN MALE: Primary | ICD-10-CM

## 2021-01-27 PROCEDURE — 96372 THER/PROPH/DIAG INJ SC/IM: CPT | Performed by: FAMILY MEDICINE

## 2021-01-27 RX ORDER — TESTOSTERONE CYPIONATE 200 MG/ML
200 INJECTION, SOLUTION INTRAMUSCULAR ONCE
Status: COMPLETED | OUTPATIENT
Start: 2021-01-27 | End: 2021-01-27

## 2021-01-27 RX ADMIN — TESTOSTERONE CYPIONATE 200 MG: 200 INJECTION, SOLUTION INTRAMUSCULAR at 15:23

## 2021-02-03 ENCOUNTER — CLINICAL SUPPORT (OUTPATIENT)
Dept: FAMILY MEDICINE CLINIC | Facility: CLINIC | Age: 41
End: 2021-02-03

## 2021-02-03 DIAGNOSIS — R79.89 LOW TESTOSTERONE IN MALE: Primary | ICD-10-CM

## 2021-02-03 PROCEDURE — 96372 THER/PROPH/DIAG INJ SC/IM: CPT | Performed by: FAMILY MEDICINE

## 2021-02-03 RX ORDER — TESTOSTERONE CYPIONATE 200 MG/ML
200 INJECTION, SOLUTION INTRAMUSCULAR ONCE
Status: COMPLETED | OUTPATIENT
Start: 2021-02-03 | End: 2021-02-03

## 2021-02-03 RX ADMIN — TESTOSTERONE CYPIONATE 200 MG: 200 INJECTION, SOLUTION INTRAMUSCULAR at 13:15

## 2021-02-10 ENCOUNTER — CLINICAL SUPPORT (OUTPATIENT)
Dept: FAMILY MEDICINE CLINIC | Facility: CLINIC | Age: 41
End: 2021-02-10

## 2021-02-10 DIAGNOSIS — R79.89 LOW TESTOSTERONE IN MALE: Primary | ICD-10-CM

## 2021-02-10 PROCEDURE — 96372 THER/PROPH/DIAG INJ SC/IM: CPT | Performed by: FAMILY MEDICINE

## 2021-02-10 RX ORDER — TESTOSTERONE CYPIONATE 200 MG/ML
200 INJECTION, SOLUTION INTRAMUSCULAR ONCE
Status: COMPLETED | OUTPATIENT
Start: 2021-02-10 | End: 2021-02-10

## 2021-02-10 RX ADMIN — TESTOSTERONE CYPIONATE 200 MG: 200 INJECTION, SOLUTION INTRAMUSCULAR at 12:44

## 2021-02-17 ENCOUNTER — CLINICAL SUPPORT (OUTPATIENT)
Dept: FAMILY MEDICINE CLINIC | Facility: CLINIC | Age: 41
End: 2021-02-17

## 2021-02-17 DIAGNOSIS — R79.89 LOW TESTOSTERONE IN MALE: Primary | ICD-10-CM

## 2021-02-17 PROCEDURE — 96372 THER/PROPH/DIAG INJ SC/IM: CPT | Performed by: FAMILY MEDICINE

## 2021-02-17 RX ORDER — NEEDLES, DISPOSABLE 25GX5/8"
1 NEEDLE, DISPOSABLE MISCELLANEOUS
Qty: 25 EACH | Refills: 1 | Status: SHIPPED | OUTPATIENT
Start: 2021-02-17

## 2021-02-17 RX ORDER — TESTOSTERONE CYPIONATE 200 MG/ML
200 INJECTION, SOLUTION INTRAMUSCULAR ONCE
Status: COMPLETED | OUTPATIENT
Start: 2021-02-17 | End: 2021-02-17

## 2021-02-17 RX ADMIN — TESTOSTERONE CYPIONATE 200 MG: 200 INJECTION, SOLUTION INTRAMUSCULAR at 10:59

## 2021-03-22 DIAGNOSIS — R79.89 LOW TESTOSTERONE IN MALE: ICD-10-CM

## 2021-03-22 DIAGNOSIS — R79.89 LOW TESTOSTERONE IN MALE: Primary | ICD-10-CM

## 2021-03-27 LAB
ESTROGEN SERPL-MCNC: 204 PG/ML (ref 40–115)
PSA SERPL-MCNC: 0.61 NG/ML (ref 0–4)
TESTOST FREE SERPL-MCNC: 26.2 PG/ML (ref 6.8–21.5)
TESTOST SERPL-MCNC: 947 NG/DL (ref 264–916)

## 2021-03-30 ENCOUNTER — OFFICE VISIT (OUTPATIENT)
Dept: FAMILY MEDICINE CLINIC | Facility: CLINIC | Age: 41
End: 2021-03-30

## 2021-03-30 VITALS
OXYGEN SATURATION: 99 % | HEART RATE: 98 BPM | BODY MASS INDEX: 39.36 KG/M2 | WEIGHT: 297 LBS | TEMPERATURE: 97.7 F | DIASTOLIC BLOOD PRESSURE: 80 MMHG | SYSTOLIC BLOOD PRESSURE: 126 MMHG | HEIGHT: 73 IN

## 2021-03-30 DIAGNOSIS — R79.89 LOW TESTOSTERONE IN MALE: Primary | ICD-10-CM

## 2021-03-30 PROCEDURE — 99213 OFFICE O/P EST LOW 20 MIN: CPT | Performed by: FAMILY MEDICINE

## 2021-03-30 RX ORDER — ANASTROZOLE 1 MG/1
1 TABLET ORAL DAILY
Qty: 90 TABLET | Refills: 3 | Status: SHIPPED | OUTPATIENT
Start: 2021-03-30 | End: 2022-04-11

## 2021-03-30 NOTE — PROGRESS NOTES
Subjective   Jose Orta is a 40 y.o. male who is here for   Chief Complaint   Patient presents with   • low tesosterone      lab f/u - needs syringe  and needles sent to pharmacy    .     History of Present Illness   Feeling much better  No drinking.  No SE of the depo T  His wife is now administering the injections    The following portions of the patient's history were reviewed and updated as appropriate: allergies, current medications, past family history, past medical history, past social history, past surgical history and problem list.    Review of Systems    Objective   Physical Exam  Vitals reviewed.   Pulmonary:      Effort: Pulmonary effort is normal.   Neurological:      Mental Status: He is alert.   Psychiatric:         Mood and Affect: Mood normal.          Results for orders placed or performed in visit on 03/22/21   Estrogens, Total    Specimen: Blood   Result Value Ref Range    Estrogen 204 (H) 40 - 115 pg/mL   PSA DIAGNOSTIC    Specimen: Blood   Result Value Ref Range    PSA 0.613 0.000 - 4.000 ng/mL   Testosterone, Free, Total    Specimen: Blood   Result Value Ref Range    Testosterone, Total 947 (H) 264 - 916 ng/dL    Testosterone, Free 26.2 (H) 6.8 - 21.5 pg/mL         Assessment/Plan   Diagnoses and all orders for this visit:    1. Low testosterone in male (Primary)  -     anastrozole (Arimidex) 1 MG tablet; Take 1 tablet by mouth Daily. Indications: estrogen blocker  Dispense: 90 tablet; Refill: 3  -     Testosterone, Free, Total; Future  -     PSA DIAGNOSTIC; Future  -     Estrogens, Total; Future      There are no Patient Instructions on file for this visit.    There are no discontinued medications.     Return in about 3 months (around 6/30/2021).    Dr. Jose Pham  [place], [place]

## 2021-07-02 ENCOUNTER — TELEPHONE (OUTPATIENT)
Dept: FAMILY MEDICINE CLINIC | Facility: CLINIC | Age: 41
End: 2021-07-02

## 2021-07-02 NOTE — TELEPHONE ENCOUNTER
Caller: Jose Orta    Relationship: Self    Best call back number: [phone]     Caller requesting test results: PATIENT     What test was performed: TESTOSTERONE CHECK    When was the test performed: 06/29/2021    Where was the test performed: [facility]    Additional notes: THE PATIENT WOULD LIKE TO HAVE SOMEONE RETURN HIS CALL WITH HIS TEST RESULTS. VOICEMAIL IS OKAY.

## 2021-07-02 NOTE — TELEPHONE ENCOUNTER
Hub to share:     No results are back yet, patient is schedule Tuesday 7/6 for follow up on labs.

## 2021-07-06 ENCOUNTER — OFFICE VISIT (OUTPATIENT)
Dept: FAMILY MEDICINE CLINIC | Facility: CLINIC | Age: 41
End: 2021-07-06

## 2021-07-06 VITALS
HEART RATE: 100 BPM | TEMPERATURE: 98 F | WEIGHT: 279 LBS | SYSTOLIC BLOOD PRESSURE: 122 MMHG | HEIGHT: 73 IN | DIASTOLIC BLOOD PRESSURE: 82 MMHG | OXYGEN SATURATION: 99 % | BODY MASS INDEX: 36.98 KG/M2

## 2021-07-06 DIAGNOSIS — E66.01 CLASS 3 SEVERE OBESITY DUE TO EXCESS CALORIES WITH SERIOUS COMORBIDITY AND BODY MASS INDEX (BMI) OF 40.0 TO 44.9 IN ADULT (HCC): ICD-10-CM

## 2021-07-06 DIAGNOSIS — R79.89 LOW TESTOSTERONE IN MALE: Primary | ICD-10-CM

## 2021-07-06 DIAGNOSIS — Z12.5 SCREENING FOR PROSTATE CANCER: ICD-10-CM

## 2021-07-06 PROCEDURE — 99213 OFFICE O/P EST LOW 20 MIN: CPT | Performed by: FAMILY MEDICINE

## 2021-07-06 RX ORDER — PHENTERMINE HYDROCHLORIDE 37.5 MG/1
37.5 TABLET ORAL
Qty: 30 TABLET | Refills: 1 | Status: SHIPPED | OUTPATIENT
Start: 2021-07-06 | End: 2022-04-11

## 2021-07-06 RX ORDER — TESTOSTERONE CYPIONATE 200 MG/ML
400 INJECTION, SOLUTION INTRAMUSCULAR
Qty: 4 ML | Refills: 5 | Status: SHIPPED | OUTPATIENT
Start: 2021-07-06 | End: 2022-04-11 | Stop reason: SDUPTHER

## 2021-07-06 NOTE — PROGRESS NOTES
"  Subjective   Jose Orta is a 40 y.o. male who is here for   Chief Complaint   Patient presents with   • low testosterone    .     History of Present Illness   Jose still doing quite well.  He has had nice continue weight loss.  He is been sober now for months.  The testosterone injections every week he is getting tired of the inject himself every 7 days.  We will increase the every 14-day dose to 40 mg each time.  In order to get the elevated dose with fewer injections.    Like to also restart the phentermine.  We will start at a half a pill a day.      The following portions of the patient's history were reviewed and updated as appropriate: allergies, current medications, past family history, past medical history, past social history, past surgical history and problem list.    Review of Systems    Objective   Vitals:    07/06/21 1235   BP: 122/82   BP Location: Left arm   Patient Position: Sitting   Cuff Size: Large Adult   Pulse: 100   Temp: 98 °F (36.7 °C)   TempSrc: Temporal   SpO2: 99%   Weight: 127 kg (279 lb)   Height: 184.2 cm (72.5\")      Physical Exam  Vitals reviewed.   Cardiovascular:      Rate and Rhythm: Normal rate.   Pulmonary:      Effort: Pulmonary effort is normal.   Neurological:      Mental Status: He is alert.   Psychiatric:         Mood and Affect: Mood normal.          Results for orders placed or performed in visit on 07/01/21   Testosterone, Free, Total    Specimen: Blood   Result Value Ref Range    Testosterone, Total 852 264 - 916 ng/dL    Testosterone, Free 25.5 (H) 6.8 - 21.5 pg/mL   PSA DIAGNOSTIC    Specimen: Blood   Result Value Ref Range    PSA 0.789 0.000 - 4.000 ng/mL   Estrogens, Total    Specimen: Blood   Result Value Ref Range    Estrogen 89 40 - 115 pg/mL         Assessment/Plan   Diagnoses and all orders for this visit:    1. Low testosterone in male (Primary)  -     Testosterone Cypionate (Depo-Testosterone) 200 MG/ML injection; Inject 2 mL into the appropriate " muscle as directed by prescriber Every 14 (Fourteen) Days.  Dispense: 4 mL; Refill: 5  -     CBC (No Diff); Future  -     Comprehensive Metabolic Panel; Future  -     Lipid Panel; Future  -     Urinalysis With Microscopic If Indicated (No Culture) - Urine, Clean Catch; Future  -     Testosterone, Free, Total; Future  -     TSH; Future  -     PSA Screen; Future  -     Estrogens, Total; Future    2. Class 3 severe obesity due to excess calories with serious comorbidity and body mass index (BMI) of 40.0 to 44.9 in adult (CMS/[facility])  -     phentermine (Adipex-P) 37.5 MG tablet; Take 1 tablet by mouth Every Morning Before Breakfast.  Dispense: 30 tablet; Refill: 1    3. Screening for prostate cancer  -     PSA Screen; Future      There are no Patient Instructions on file for this visit.    Medications Discontinued During This Encounter   Medication Reason   • Testosterone Cypionate (Depo-Testosterone) 200 MG/ML injection Reorder        No follow-ups on file.    Dr. Jose Pham  [place], [place]

## 2021-11-18 ENCOUNTER — TELEPHONE (OUTPATIENT)
Dept: FAMILY MEDICINE CLINIC | Facility: CLINIC | Age: 41
End: 2021-11-18

## 2021-11-18 DIAGNOSIS — R79.89 LOW TESTOSTERONE IN MALE: ICD-10-CM

## 2021-11-18 RX ORDER — NEEDLES, DISPOSABLE 25GX5/8"
1 NEEDLE, DISPOSABLE MISCELLANEOUS
Qty: 25 EACH | Refills: 1 | Status: CANCELLED | OUTPATIENT
Start: 2021-11-18

## 2021-11-18 NOTE — TELEPHONE ENCOUNTER
Sent in the Depo testosterone syringe with needle.  #25  1 every 2 weeks  Jose Pham MD      Caller: Jose Orta    Relationship: Self    Best call back number: [phone]    Requested Prescriptions:   SYRINGS     Pharmacy where request should be sent: [id number] [place], [place] - [address] AT [facility] - [phone]  - [phone] FX     Additional details provided by patient: PATIENT HAS NOT BEEN GETTING HIS SYRINGES EVERY TIME HE REQUESTS HIS TESTOSTERONE.  PATIENT STATED THAT THE PHARMACY GIVES HIM A REALLY HARD TIME AND NEVER GIVES HIM A SYRING TO BE ABLE TO INJECT HIS MEDICATION.    PATIENT REQUESTED IF THERE IS A SYRING WITH A NEEDLE ATTACHED IS HE ABLE TO RECEIVE THAT SO HE DOES NOT HAVE TO GO THROUGH THIS EACH MONTH.    PATIENT IS COMPLETELY OUT OF THE SYRINGES.    Does the patient have less than a 3 day supply:  [x] Yes  [] No    Rommel Chavez Rep   11/18/21 14:48 EST

## 2022-04-11 ENCOUNTER — OFFICE VISIT (OUTPATIENT)
Dept: FAMILY MEDICINE CLINIC | Facility: CLINIC | Age: 42
End: 2022-04-11

## 2022-04-11 VITALS
DIASTOLIC BLOOD PRESSURE: 80 MMHG | WEIGHT: 261 LBS | BODY MASS INDEX: 34.59 KG/M2 | TEMPERATURE: 97.3 F | OXYGEN SATURATION: 97 % | HEIGHT: 73 IN | SYSTOLIC BLOOD PRESSURE: 130 MMHG | HEART RATE: 86 BPM

## 2022-04-11 DIAGNOSIS — R79.89 LOW TESTOSTERONE IN MALE: ICD-10-CM

## 2022-04-11 DIAGNOSIS — F33.0 MAJOR DEPRESSIVE DISORDER, RECURRENT EPISODE, MILD DEGREE: Primary | ICD-10-CM

## 2022-04-11 PROBLEM — F41.9 ANXIETY DISORDER: Status: RESOLVED | Noted: 2017-09-26 | Resolved: 2022-04-11

## 2022-04-11 PROCEDURE — 99213 OFFICE O/P EST LOW 20 MIN: CPT | Performed by: FAMILY MEDICINE

## 2022-04-11 RX ORDER — BUPROPION HYDROCHLORIDE 150 MG/1
150 TABLET ORAL EVERY MORNING
Qty: 30 TABLET | Refills: 0 | Status: SHIPPED | OUTPATIENT
Start: 2022-04-11 | End: 2022-05-03 | Stop reason: SDUPTHER

## 2022-04-11 RX ORDER — TESTOSTERONE CYPIONATE 200 MG/ML
200 INJECTION, SOLUTION INTRAMUSCULAR
Qty: 4 ML | Refills: 5 | Status: SHIPPED | OUTPATIENT
Start: 2022-04-11 | End: 2022-07-01

## 2022-04-11 NOTE — PROGRESS NOTES
"Answers for HPI/ROS submitted by the patient on 4/10/2022  What is the primary reason for your visit?: Other  Please describe your symptoms.: Depression and anxiety  Have you had these symptoms before?: Yes  How long have you been having these symptoms?: Greater than 2 weeks      Subjective   Jose Orta is a 41 y.o. male who is here for   Chief Complaint   Patient presents with   • Depression     For the past few weeks has been feeling down/stuck    .     History of Present Illness   Jose is here to discuss his depression.  Been depressed for several months now.  He is seeing a psychiatrist a couple times last year.  But no further follow-up.  No medications were started.  Also seeing the weight loss clinic, 25 again.  He still taking his testosterone Depo.  Although at a lower dose.    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  Marriage is good.  Job is good.  Kids are great.  But despite all this he is feeling quite depressed.  No motivation.  Would rather stay in bed.    The following portions of the patient's history were reviewed and updated as appropriate: allergies, current medications, past family history, past medical history, past social history, past surgical history and problem list.    Review of Systems   Psychiatric/Behavioral: Positive for behavioral problems and dysphoric mood. Negative for decreased concentration and hallucinations. The patient is nervous/anxious. The patient is not hyperactive.        Objective   Vitals:    04/11/22 1317   BP: 130/80   BP Location: Left arm   Patient Position: Sitting   Cuff Size: Large Adult   Pulse: 86   Temp: 97.3 °F (36.3 °C)   SpO2: 97%   Weight: 118 kg (261 lb)   Height: 184.2 cm (72.5\")      Physical Exam  Vitals reviewed.   Neurological:      Mental Status: He is alert.   Psychiatric:         Mood and Affect: Mood normal.         Assessment/Plan   Diagnoses and all orders for this visit:    1. Major depressive disorder, " recurrent episode, mild degree (HCC) (Primary)  -     buPROPion XL (Wellbutrin XL) 150 MG 24 hr tablet; Take 1 tablet by mouth Every Morning.  Dispense: 30 tablet; Refill: 0    2. Low testosterone in male  -     Testosterone Cypionate (Depo-Testosterone) 200 MG/ML injection; Inject 1 mL into the appropriate muscle as directed by prescriber Every 7 (Seven) Days.  Dispense: 4 mL; Refill: 5    Will try Wellbutrin.  Refill routine testosterone  See him back in a month    There are no Patient Instructions on file for this visit.    Medications Discontinued During This Encounter   Medication Reason   • anastrozole (Arimidex) 1 MG tablet *Therapy completed   • ketoconazole (NIZORAL) 200 MG tablet *Therapy completed   • naltrexone (DEPADE) 50 MG tablet *Therapy completed   • Loratadine 10 MG capsule *Therapy completed   • phentermine (Adipex-P) 37.5 MG tablet Not Efficacious   • Testosterone Cypionate (Depo-Testosterone) 200 MG/ML injection Reorder        No follow-ups on file.    Dr. Jose Pham  [place], [place]

## 2022-04-20 ENCOUNTER — TELEPHONE (OUTPATIENT)
Dept: FAMILY MEDICINE CLINIC | Facility: CLINIC | Age: 42
End: 2022-04-20

## 2022-04-20 NOTE — TELEPHONE ENCOUNTER
We can certainly offer him an earlier office visit to discuss something to treat his panic attacks.

## 2022-04-20 NOTE — TELEPHONE ENCOUNTER
Pt is having panic attacks as well, he said he is fine to wait until he see's you and keep on the dame dose, but doesn't know what to do about these symptoms/feelings he is having. Is there anything he can take as needed for the panic attacks? Please advise

## 2022-04-20 NOTE — TELEPHONE ENCOUNTER
Stay on current medication  We will be able to discuss medication/depression, during upcoming OV  It takes 4-6 weeks to reach steady state of a new depression medication

## 2022-04-20 NOTE — TELEPHONE ENCOUNTER
Caller: Jose Orta    Relationship: Self    Best call back number: [phone]    What is the best time to reach you: ANYTIME, AS SOON AS POSSIBLE    Who are you requesting to speak with (clinical staff, provider,  specific staff member): DR. ZENDEJAS    What was the call regarding: PATIENT STATES HE HAS SEEN SOME IMPROVEMENT FROM buPROPion XL (Wellbutrin XL) 150 MG 24 hr tablet BUT TODAY HE IS HAVING A HARD TIME WITH HIS DEPRESSION AND ANXIETY.     PATIENT IS WANTING TO KNOW IF HE SHOULD HAVE HIS DOSAGE UPPED OR WHAT HE SHOULD DO. PATIENT STATES HE TOOK THE DAY OFF OF WORK TODAY AND IS REQUESTING A CALL BACK AS SOON AS POSSIBLE.

## 2022-04-21 ENCOUNTER — OFFICE VISIT (OUTPATIENT)
Dept: FAMILY MEDICINE CLINIC | Facility: CLINIC | Age: 42
End: 2022-04-21

## 2022-04-21 VITALS
SYSTOLIC BLOOD PRESSURE: 120 MMHG | BODY MASS INDEX: 34.46 KG/M2 | WEIGHT: 260 LBS | OXYGEN SATURATION: 99 % | TEMPERATURE: 97.5 F | HEART RATE: 90 BPM | HEIGHT: 73 IN | DIASTOLIC BLOOD PRESSURE: 84 MMHG

## 2022-04-21 DIAGNOSIS — F41.0 PANIC ATTACK: Primary | ICD-10-CM

## 2022-04-21 PROBLEM — R53.82 CHRONIC FATIGUE: Status: RESOLVED | Noted: 2020-11-24 | Resolved: 2022-04-21

## 2022-04-21 PROCEDURE — 99213 OFFICE O/P EST LOW 20 MIN: CPT | Performed by: FAMILY MEDICINE

## 2022-04-21 RX ORDER — BUSPIRONE HYDROCHLORIDE 15 MG/1
15 TABLET ORAL 3 TIMES DAILY PRN
Qty: 60 TABLET | Refills: 0 | Status: SHIPPED | OUTPATIENT
Start: 2022-04-21 | End: 2022-05-03 | Stop reason: SDUPTHER

## 2022-04-21 NOTE — PROGRESS NOTES
"  Subjective   Jose Orta is a 41 y.o. male who is here for   Chief Complaint   Patient presents with   • Depression   • Panic Attack          .   Caller: Jose Orta     Relationship: Self     Best call back number: [phone]     What is the best time to reach you: ANYTIME, AS SOON AS POSSIBLE     Who are you requesting to speak with (clinical staff, provider,  specific staff member): DR. ZENDEJAS     What was the call regarding: PATIENT STATES HE HAS SEEN SOME IMPROVEMENT FROM buPROPion XL (Wellbutrin XL) 150 MG 24 hr tablet BUT TODAY HE IS HAVING A HARD TIME WITH HIS DEPRESSION AND ANXIETY.      PATIENT IS WANTING TO KNOW IF HE SHOULD HAVE HIS DOSAGE UPPED OR WHAT HE SHOULD DO. PATIENT STATES HE TOOK THE DAY OFF OF WORK TODAY AND IS REQUESTING A CALL BACK AS SOON AS POSSIBLE.     History of Present Illness     Jose comes back early is anxiety still not well controlled.  Thinks Wellbutrin is helping somewhat.  Is only going on for about a week and a half.  Explained it could take up to 4 to 6 weeks to reach steady state without medication.  He is still sober.  Had a panic attack a few weeks ago.  Felt another 1 come on this week.  Mainly due to stresses at work.    The following portions of the patient's history were reviewed and updated as appropriate: allergies, current medications, past family history, past medical history, past social history, past surgical history and problem list.    Review of Systems    Objective   Vitals:    04/21/22 0949   BP: 120/84   BP Location: Left arm   Patient Position: Sitting   Cuff Size: Large Adult   Pulse: 90   Temp: 97.5 °F (36.4 °C)   SpO2: 99%   Weight: 118 kg (260 lb)   Height: 184.2 cm (72.5\")      Physical Exam  Vitals reviewed.   Psychiatric:         Mood and Affect: Mood normal.         Assessment/Plan   Diagnoses and all orders for this visit:    1. Panic attack (Primary)  -     busPIRone (BUSPAR) 15 MG tablet; Take 1 tablet by mouth 3 (Three) Times a " Day As Needed (panic).  Dispense: 60 tablet; Refill: 0    We will add on BuSpar for his panic attacks  We will see him back in 2 weeks  Continue Wellbutrin.  He will check with his insurance to see if it covers therapy.  Otherwise he is encouraged to call his previous therapist, who he may progress with.  Although he has to pay cash for those visits    There are no Patient Instructions on file for this visit.    There are no discontinued medications.     No follow-ups on file.    Dr. Jose Pham  [place], [place]

## 2022-05-03 ENCOUNTER — OFFICE VISIT (OUTPATIENT)
Dept: FAMILY MEDICINE CLINIC | Facility: CLINIC | Age: 42
End: 2022-05-03

## 2022-05-03 VITALS
WEIGHT: 260 LBS | TEMPERATURE: 97.5 F | DIASTOLIC BLOOD PRESSURE: 72 MMHG | HEIGHT: 73 IN | OXYGEN SATURATION: 99 % | HEART RATE: 84 BPM | BODY MASS INDEX: 34.46 KG/M2 | SYSTOLIC BLOOD PRESSURE: 120 MMHG

## 2022-05-03 DIAGNOSIS — H00.014 HORDEOLUM EXTERNUM OF LEFT UPPER EYELID: Primary | ICD-10-CM

## 2022-05-03 DIAGNOSIS — F33.0 MAJOR DEPRESSIVE DISORDER, RECURRENT EPISODE, MILD DEGREE: ICD-10-CM

## 2022-05-03 DIAGNOSIS — F41.0 PANIC ATTACK: ICD-10-CM

## 2022-05-03 PROCEDURE — 99214 OFFICE O/P EST MOD 30 MIN: CPT | Performed by: FAMILY MEDICINE

## 2022-05-03 RX ORDER — BUSPIRONE HYDROCHLORIDE 15 MG/1
15 TABLET ORAL 3 TIMES DAILY PRN
Qty: 90 TABLET | Refills: 5 | Status: SHIPPED | OUTPATIENT
Start: 2022-05-03

## 2022-05-03 RX ORDER — GENTAMICIN SULFATE 1 MG/G
1 OINTMENT TOPICAL 4 TIMES DAILY
Qty: 30 G | Refills: 0 | Status: SHIPPED | OUTPATIENT
Start: 2022-05-03 | End: 2022-10-10 | Stop reason: ALTCHOICE

## 2022-05-03 RX ORDER — BUPROPION HYDROCHLORIDE 300 MG/1
300 TABLET ORAL EVERY MORNING
Qty: 90 TABLET | Refills: 0 | Status: SHIPPED | OUTPATIENT
Start: 2022-05-03 | End: 2022-07-01 | Stop reason: SDUPTHER

## 2022-05-03 NOTE — PROGRESS NOTES
"  Subjective   Jose Orta is a 41 y.o. male who is here for   Chief Complaint   Patient presents with   • Panic Attack     F/u buspar - helping    • Depression     Wellbutrin not helping    • Stye     Left eye    .   Answers for HPI/ROS submitted by the patient on 5/3/2022  What is the primary reason for your visit?: Other  Please describe your symptoms.: Follow up regarding Wellbutrin and Busprione  Have you had these symptoms before?: Yes  How long have you been having these symptoms?: Greater than 2 weeks  Please list any medications you are currently taking for this condition.: Follow up regarding Wellbutrin and Busprione  Please describe any probable cause for these symptoms. : Follow up regarding Wellbutrin and Busprione      History of Present Illness   Jose is feeling much better.  Depression is improved.  But still room for improvement thinks Wellbutrin is helping somewhat but there is still some issues    His panic attacks are much better with that 15 mg BuSpar 3 times a day    Also has a reoccurring left upper eyelid stye    The following portions of the patient's history were reviewed and updated as appropriate: allergies, current medications, past family history, past medical history, past social history, past surgical history and problem list.    Review of Systems    Objective   Vitals:    05/03/22 1341   BP: 120/72   BP Location: Left arm   Patient Position: Sitting   Cuff Size: Large Adult   Pulse: 84   Temp: 97.5 °F (36.4 °C)   SpO2: 99%   Weight: 118 kg (260 lb)   Height: 184.2 cm (72.5\")      Physical Exam  Eyes:      General:         Left eye: Hordeolum present.    Cardiovascular:      Rate and Rhythm: Normal rate.   Pulmonary:      Effort: Pulmonary effort is normal.   Neurological:      Mental Status: He is alert.   Psychiatric:         Mood and Affect: Mood normal.         Assessment/Plan   Diagnoses and all orders for this visit:    1. Hordeolum externum of left upper eyelid " (Primary)  -     gentamicin (GARAMYCIN) 0.1 % ointment; Apply 1 application topically to the appropriate area as directed 4 (Four) Times a Day.  Dispense: 30 g; Refill: 0    2. Panic attack  -     busPIRone (BUSPAR) 15 MG tablet; Take 1 tablet by mouth 3 (Three) Times a Day As Needed (panic).  Dispense: 90 tablet; Refill: 5    3. Major depressive disorder, recurrent episode, mild degree (HCC)  -     buPROPion XL (Wellbutrin XL) 300 MG 24 hr tablet; Take 1 tablet by mouth Every Morning.  Dispense: 90 tablet; Refill: 0    will double Wellbutrin to 300 mg a day.    We will renew and continue BuSpar 15 3 times daily        There are no Patient Instructions on file for this visit.    Medications Discontinued During This Encounter   Medication Reason   • buPROPion XL (Wellbutrin XL) 150 MG 24 hr tablet Reorder   • busPIRone (BUSPAR) 15 MG tablet Reorder        Return in about 1 month (around 6/3/2022) for new medication follow up.    Dr. Jose Pham  [facility] [place], [place]

## 2022-05-21 DIAGNOSIS — E66.01 CLASS 3 SEVERE OBESITY DUE TO EXCESS CALORIES WITH SERIOUS COMORBIDITY AND BODY MASS INDEX (BMI) OF 40.0 TO 44.9 IN ADULT: ICD-10-CM

## 2022-05-21 RX ORDER — PHENTERMINE HYDROCHLORIDE 37.5 MG/1
37.5 TABLET ORAL
Qty: 30 TABLET | OUTPATIENT
Start: 2022-05-21

## 2022-06-03 ENCOUNTER — OFFICE VISIT (OUTPATIENT)
Dept: FAMILY MEDICINE CLINIC | Facility: CLINIC | Age: 42
End: 2022-06-03

## 2022-06-03 VITALS
OXYGEN SATURATION: 99 % | WEIGHT: 260 LBS | HEIGHT: 73 IN | SYSTOLIC BLOOD PRESSURE: 124 MMHG | TEMPERATURE: 97.7 F | BODY MASS INDEX: 34.46 KG/M2 | DIASTOLIC BLOOD PRESSURE: 82 MMHG | HEART RATE: 77 BPM

## 2022-06-03 DIAGNOSIS — E66.01 CLASS 3 SEVERE OBESITY DUE TO EXCESS CALORIES WITH SERIOUS COMORBIDITY AND BODY MASS INDEX (BMI) OF 40.0 TO 44.9 IN ADULT: Primary | ICD-10-CM

## 2022-06-03 DIAGNOSIS — F32.A ANXIETY AND DEPRESSION: ICD-10-CM

## 2022-06-03 DIAGNOSIS — F41.9 ANXIETY AND DEPRESSION: ICD-10-CM

## 2022-06-03 PROCEDURE — 99213 OFFICE O/P EST LOW 20 MIN: CPT | Performed by: FAMILY MEDICINE

## 2022-06-03 RX ORDER — PHENTERMINE HYDROCHLORIDE 37.5 MG/1
37.5 CAPSULE ORAL EVERY MORNING
Qty: 30 CAPSULE | Refills: 0 | Status: SHIPPED | OUTPATIENT
Start: 2022-06-03 | End: 2022-07-01 | Stop reason: SDUPTHER

## 2022-06-03 NOTE — PROGRESS NOTES
"  Subjective   Jose Orta is a 41 y.o. male who is here for   Chief Complaint   Patient presents with   • Depression   • Panic Attack          • Med Refill     Would like to go back on phetermine    .   Answers for HPI/ROS submitted by the patient on 5/27/2022  What is the primary reason for your visit?: Other  Please describe your symptoms.: Check in on busprione and buproprion  Have you had these symptoms before?: No  How long have you been having these symptoms?: 1-4 days  Please list any medications you are currently taking for this condition.: busprione and buproprion  Please describe any probable cause for these symptoms. : NA      History of Present Illness     Feeling much better emotionally.  The increase of the Wellbutrin to 300 worked tremendously.  Depression is better anxiety is better focus is better.  Improved relationships at work and wife.  Still taking the BuSpar 1 or 2 or sometimes 3 times a day.  He has remained sober  He is going to [employer] most days of the week.  He would like to restart the Adipex for improved weight loss      The following portions of the patient's history were reviewed and updated as appropriate: allergies, current medications, past family history, past medical history, past social history, past surgical history and problem list.    Review of Systems    Objective   Vitals:    06/03/22 1331   BP: 124/82   BP Location: Left arm   Patient Position: Sitting   Cuff Size: Large Adult   Pulse: 77   Temp: 97.7 °F (36.5 °C)   SpO2: 99%   Weight: 118 kg (260 lb)   Height: 184.2 cm (72.5\")      Physical Exam  Vitals reviewed.   Cardiovascular:      Rate and Rhythm: Normal rate.   Pulmonary:      Effort: Pulmonary effort is normal.   Neurological:      Mental Status: He is alert.   Psychiatric:         Mood and Affect: Mood normal.         Assessment & Plan   Diagnoses and all orders for this visit:    1. Class 3 severe obesity due to excess calories with serious comorbidity " and body mass index (BMI) of 40.0 to 44.9 in adult (HCC) (Primary)  -     phentermine 37.5 MG capsule; Take 1 capsule by mouth Every Morning.  Dispense: 30 capsule; Refill: 0    2. Anxiety and depression      There are no Patient Instructions on file for this visit.    There are no discontinued medications.     Return in about 1 month (around 7/3/2022) for new medication follow up.    Dr. Jose Pham  [place], [place]

## 2022-07-01 ENCOUNTER — OFFICE VISIT (OUTPATIENT)
Dept: FAMILY MEDICINE CLINIC | Facility: CLINIC | Age: 42
End: 2022-07-01

## 2022-07-01 VITALS
SYSTOLIC BLOOD PRESSURE: 120 MMHG | DIASTOLIC BLOOD PRESSURE: 76 MMHG | HEIGHT: 73 IN | HEART RATE: 90 BPM | BODY MASS INDEX: 33.93 KG/M2 | OXYGEN SATURATION: 99 % | TEMPERATURE: 98.7 F | WEIGHT: 256 LBS

## 2022-07-01 DIAGNOSIS — F33.0 MAJOR DEPRESSIVE DISORDER, RECURRENT EPISODE, MILD DEGREE: ICD-10-CM

## 2022-07-01 DIAGNOSIS — E66.01 CLASS 3 SEVERE OBESITY DUE TO EXCESS CALORIES WITH SERIOUS COMORBIDITY AND BODY MASS INDEX (BMI) OF 40.0 TO 44.9 IN ADULT: ICD-10-CM

## 2022-07-01 DIAGNOSIS — R79.89 LOW TESTOSTERONE IN MALE: Primary | ICD-10-CM

## 2022-07-01 PROBLEM — B35.1 FUNGAL INFECTION OF NAIL: Status: RESOLVED | Noted: 2017-09-26 | Resolved: 2022-07-01

## 2022-07-01 PROBLEM — Z12.5 SCREENING FOR PROSTATE CANCER: Status: RESOLVED | Noted: 2021-07-06 | Resolved: 2022-07-01

## 2022-07-01 PROCEDURE — 99213 OFFICE O/P EST LOW 20 MIN: CPT | Performed by: FAMILY MEDICINE

## 2022-07-01 RX ORDER — TESTOSTERONE 40.5 MG/2.5G
1 GEL TOPICAL
Qty: 75 G | Refills: 5 | Status: SHIPPED | OUTPATIENT
Start: 2022-07-01 | End: 2022-12-27

## 2022-07-01 RX ORDER — PHENTERMINE HYDROCHLORIDE 37.5 MG/1
37.5 CAPSULE ORAL EVERY MORNING
Qty: 30 CAPSULE | Refills: 0 | Status: SHIPPED | OUTPATIENT
Start: 2022-07-01 | End: 2022-10-10 | Stop reason: SDUPTHER

## 2022-07-01 RX ORDER — BUPROPION HYDROCHLORIDE 300 MG/1
300 TABLET ORAL EVERY MORNING
Qty: 90 TABLET | Refills: 1 | Status: SHIPPED | OUTPATIENT
Start: 2022-07-01 | End: 2022-08-29 | Stop reason: SDUPTHER

## 2022-07-01 NOTE — PROGRESS NOTES
"  Subjective   Jose Orta is a 41 y.o. male who is here for   Chief Complaint   Patient presents with   • Weight Check   .     History of Present Illness Answers for HPI/ROS submitted by the patient on 7/1/2022  What is the primary reason for your visit?: Other  Please describe your symptoms.: Follow up on meds, etc  Have you had these symptoms before?: No  How long have you been having these symptoms?: 1-4 days  Please list any medications you are currently taking for this condition.: busprione and wellbutrin  Please describe any probable cause for these symptoms. : N/A       Jose is doing very well.  Remains sober off of alcohol.  Job is going well.  Home life is going well.  Since he is now exercising, he would like to stop the IM injections of testosterone.  And go back to the topical AndroGel.    Has not been taking much of his phentermine.  But would like to continue and asked for refill    Mood and depression is stable.  Doing well on Wellbutrin.  Not having to take BuSpar as much for panic attacks, as his panic attacks are improved.    Also cut back on his caffeine.  Now limited to 1-2 servings a day    Will have upcoming left eyelid surgery in a few weeks.  Due to a chronic stye  The following portions of the patient's history were reviewed and updated as appropriate: allergies, current medications, past medical history, past social history, past surgical history and problem list.    Review of Systems    Objective   Vitals:    07/01/22 1322   BP: 120/76   Pulse: 90   Temp: 98.7 °F (37.1 °C)   SpO2: 99%   Weight: 116 kg (256 lb)   Height: 184.2 cm (72.5\")        Body mass index is 34.24 kg/m².    Physical Exam  Cardiovascular:      Rate and Rhythm: Normal rate.   Pulmonary:      Effort: Pulmonary effort is normal.   Neurological:      Mental Status: He is alert.   Psychiatric:         Mood and Affect: Mood normal.         Assessment & Plan   Diagnoses and all orders for this visit:    1. Low " testosterone in male (Primary)  -     Testosterone (AndroGel) 40.5 MG/2.5GM (1.62%) gel; Place 2.5 g on the skin as directed by provider Every Morning for 30 days.  Dispense: 75 g; Refill: 5    2. Class 3 severe obesity due to excess calories with serious comorbidity and body mass index (BMI) of 40.0 to 44.9 in adult ([facility])  -     phentermine 37.5 MG capsule; Take 1 capsule by mouth Every Morning.  Dispense: 30 capsule; Refill: 0    3. Major depressive disorder, recurrent episode, mild degree (HCC)  -     buPROPion XL (Wellbutrin XL) 300 MG 24 hr tablet; Take 1 tablet by mouth Every Morning.  Dispense: 90 tablet; Refill: 1      Patient has been prescribed controlled medications.  Treatment discussed  LAVEL[place] updated and reviewed.  PDMP also reviewed and marked as reviewed.  Risk and Benefits discussed.  Per [place].    There are no Patient Instructions on file for this visit.    Medications Discontinued During This Encounter   Medication Reason   • Testosterone Cypionate (Depo-Testosterone) 200 MG/ML injection *Therapy completed   • Testosterone (AndroGel) 40.5 MG/2.5GM (1.62%) gel *Therapy completed   • buPROPion XL (Wellbutrin XL) 300 MG 24 hr tablet Reorder   • phentermine 37.5 MG capsule Reorder        Return in about 2 months (around 9/1/2022).    Dr. Jose Pham  [place], [place]

## 2022-08-01 ENCOUNTER — TELEPHONE (OUTPATIENT)
Dept: FAMILY MEDICINE CLINIC | Facility: CLINIC | Age: 42
End: 2022-08-01

## 2022-08-01 NOTE — TELEPHONE ENCOUNTER
Caller: Jose Orta    Relationship: Self    Best call back number: [phone]     What is the best time to reach you: ANY TIME     Who are you requesting to speak with (clinical staff, provider,  specific staff member): CLINICAL    What was the call regarding: PATIENT STATES THAT HE WAS PRESCRIBED THE TESTOSTERONE IN A CREAM  FORMULA RATHER THAN THE INJECTION.   PATIENT STATES HE FOUND OUT THAT HIS INSURANCE DOES NOT COVER THE CREAM OR A GEL, SO PATIENT IS ASKING THAT DR. ZENDEJAS PLEASE RESTART  THE INJECTIONS.   PLEASE CONTACT PATIENT TO ADVISE.      Do you require a callback: YES

## 2022-08-01 NOTE — TELEPHONE ENCOUNTER
Can you send in the injections due to insurance not covering gel or cream.     Suggest he has ask pharmacy for a straight cash price for generic Testosterone gel. Not using insurance.  It May be affordable?    Jose Pham MD'

## 2022-08-08 NOTE — TELEPHONE ENCOUNTER
Hub to share     Left pt v/m to contact his pharmacy and ask for the cash price w/o insurance on the testosterone gel.

## 2022-08-29 ENCOUNTER — PATIENT MESSAGE (OUTPATIENT)
Dept: FAMILY MEDICINE CLINIC | Facility: CLINIC | Age: 42
End: 2022-08-29

## 2022-08-29 DIAGNOSIS — F33.0 MAJOR DEPRESSIVE DISORDER, RECURRENT EPISODE, MILD DEGREE: ICD-10-CM

## 2022-08-29 RX ORDER — BUPROPION HYDROCHLORIDE 300 MG/1
300 TABLET ORAL EVERY MORNING
Qty: 90 TABLET | Refills: 1 | Status: SHIPPED | OUTPATIENT
Start: 2022-08-29

## 2022-08-29 NOTE — TELEPHONE ENCOUNTER
From: Jose Orta  To: Jose Pham MD  Sent: 8/29/2022 10:23 AM EDT  Subject: Bupropion Refill    I placed a Refill Order through [facility]. Last filled in July. I just  from an employer and my new benefits will not kick in until October. To be extra cautious, I wanted to make sure to get another refill in (Ordered just TODAY) so i don't have any lapses in time of taking my medicine. It's been very successful, so I wanted to make sure you would approve obtaining the refill a little early. Let me know your thoughts and if this would be okay. Thanks! -- Jose

## 2022-10-10 ENCOUNTER — OFFICE VISIT (OUTPATIENT)
Dept: FAMILY MEDICINE CLINIC | Facility: CLINIC | Age: 42
End: 2022-10-10

## 2022-10-10 VITALS
OXYGEN SATURATION: 97 % | TEMPERATURE: 97.3 F | HEIGHT: 72 IN | SYSTOLIC BLOOD PRESSURE: 120 MMHG | HEART RATE: 85 BPM | DIASTOLIC BLOOD PRESSURE: 80 MMHG | BODY MASS INDEX: 34.13 KG/M2 | WEIGHT: 252 LBS

## 2022-10-10 DIAGNOSIS — R79.89 LOW TESTOSTERONE IN MALE: ICD-10-CM

## 2022-10-10 DIAGNOSIS — E66.9 OBESITY (BMI 30.0-34.9): Primary | ICD-10-CM

## 2022-10-10 DIAGNOSIS — E66.01 CLASS 3 SEVERE OBESITY DUE TO EXCESS CALORIES WITH SERIOUS COMORBIDITY AND BODY MASS INDEX (BMI) OF 40.0 TO 44.9 IN ADULT: ICD-10-CM

## 2022-10-10 PROBLEM — E66.811 OBESITY (BMI 30.0-34.9): Status: ACTIVE | Noted: 2022-10-10

## 2022-10-10 PROBLEM — E66.813 CLASS 3 SEVERE OBESITY DUE TO EXCESS CALORIES WITH SERIOUS COMORBIDITY AND BODY MASS INDEX (BMI) OF 40.0 TO 44.9 IN ADULT: Status: RESOLVED | Noted: 2018-04-24 | Resolved: 2022-10-10

## 2022-10-10 PROCEDURE — 99214 OFFICE O/P EST MOD 30 MIN: CPT | Performed by: FAMILY MEDICINE

## 2022-10-10 RX ORDER — TESTOSTERONE CYPIONATE 200 MG/ML
INJECTION, SOLUTION INTRAMUSCULAR
COMMUNITY
Start: 2022-08-31 | End: 2022-10-12

## 2022-10-10 RX ORDER — PHENTERMINE HYDROCHLORIDE 37.5 MG/1
37.5 CAPSULE ORAL EVERY MORNING
Qty: 30 CAPSULE | Refills: 1 | Status: SHIPPED | OUTPATIENT
Start: 2022-10-10

## 2022-10-10 NOTE — PROGRESS NOTES
"  Subjective   Jose Orta is a 42 y.o. male who is here for   Chief Complaint   Patient presents with   • Obesity     Re start phentermine    • mood enhancer   .   Answers for HPI/ROS submitted by the patient on 10/6/2022  What is the primary reason for your visit?: Other  Please describe your symptoms.: Refill on Phentermine.  Discuss a mood enhancer. Feel very little lala  or happiness.  Currently on Welbutrin which has helped with depression, however,  something to enhance mood would be fantastic, if applicable  Have you had these symptoms before?: Yes  How long have you been having these symptoms?: Greater than 2 weeks      History of Present Illness     Jose comes to renew his Adipex refill.  It is very helpful him to lose weight.  At one time he was over 300 pounds.  He is still going the gym and lifting weights and feels much better.  He has been sober now for 2 years.  He is gone back to his previous job and is enjoying things there.    His anxiety depression is fairly well controlled.  Not having whole lot of lala and happiness he is just middle-of-the-road.  We will continue his Wellbutrin for now    He still remains on the injectable testosterone.  We may convert back to the topical AndroGel as now his insurance has changed.  He may prefer the topical versus the injection    The following portions of the patient's history were reviewed and updated as appropriate: allergies, current medications, past family history, past medical history, past social history, past surgical history and problem list.    Review of Systems    Objective   Vitals:    10/10/22 1310   BP: 120/80   BP Location: Left arm   Patient Position: Sitting   Cuff Size: Large Adult   Pulse: 85   Temp: 97.3 °F (36.3 °C)   SpO2: 97%   Weight: 114 kg (252 lb)   Height: 181.6 cm (71.5\")      Physical Exam  Vitals reviewed.   Cardiovascular:      Rate and Rhythm: Normal rate.   Pulmonary:      Effort: Pulmonary effort is normal. "   Neurological:      Mental Status: He is alert.   Psychiatric:         Mood and Affect: Mood normal.         Assessment & Plan   Diagnoses and all orders for this visit:    1. Obesity (BMI 30.0-34.9) (Primary)  -     phentermine 37.5 MG capsule; Take 1 capsule by mouth Every Morning.  Dispense: 30 capsule; Refill: 1    2. Class 3 severe obesity due to excess calories with serious comorbidity and body mass index (BMI) of 40.0 to 44.9 in adult (HCC)    3. Low testosterone in male    Stay on same Wellbutrin dose    Stay on current Depo testosterone          Patient has been prescribed controlled medications.  Treatment discussed  LAVELLE updated and reviewed.  PDMP also reviewed and marked as reviewed.  Risk and Benefits discussed.  Per [place].    There are no Patient Instructions on file for this visit.    Medications Discontinued During This Encounter   Medication Reason   • dicloxacillin (DYNAPEN) 500 MG capsule Discontinued by another clinician   • gentamicin (GARAMYCIN) 0.1 % ointment Discontinued by another clinician   • phentermine 37.5 MG capsule Reorder        Return in about 2 months (around 12/10/2022) for new medication follow up.    Dr. Jose Pham  [facility]  [place], [place]

## 2022-10-11 DIAGNOSIS — R79.89 LOW TESTOSTERONE IN MALE: Primary | ICD-10-CM

## 2022-10-12 RX ORDER — TESTOSTERONE CYPIONATE 200 MG/ML
INJECTION, SOLUTION INTRAMUSCULAR
Qty: 4 ML | Refills: 1 | Status: SHIPPED | OUTPATIENT
Start: 2022-10-12 | End: 2022-12-27

## 2022-12-14 DIAGNOSIS — E66.9 OBESITY (BMI 30.0-34.9): ICD-10-CM

## 2022-12-14 RX ORDER — PHENTERMINE HYDROCHLORIDE 37.5 MG/1
37.5 CAPSULE ORAL EVERY MORNING
Qty: 30 CAPSULE | OUTPATIENT
Start: 2022-12-14

## 2022-12-14 NOTE — TELEPHONE ENCOUNTER
Hub to relay    Pt is due to follow up with medication- seen in October needed to return this month

## 2022-12-27 DIAGNOSIS — R79.89 LOW TESTOSTERONE IN MALE: ICD-10-CM

## 2022-12-27 RX ORDER — TESTOSTERONE CYPIONATE 200 MG/ML
INJECTION, SOLUTION INTRAMUSCULAR
Qty: 4 ML | Refills: 5 | Status: SHIPPED | OUTPATIENT
Start: 2022-12-27

## 2023-02-05 DIAGNOSIS — R79.89 LOW TESTOSTERONE IN MALE: ICD-10-CM

## 2023-02-06 RX ORDER — NEEDLES, FILTER 19GX1 1/2"
NEEDLE, DISPOSABLE MISCELLANEOUS
Qty: 25 EACH | Refills: 0 | Status: SHIPPED | OUTPATIENT
Start: 2023-02-06

## 2023-05-12 ENCOUNTER — OFFICE VISIT (OUTPATIENT)
Dept: FAMILY MEDICINE CLINIC | Facility: CLINIC | Age: 43
End: 2023-05-12
Payer: COMMERCIAL

## 2023-05-12 VITALS
OXYGEN SATURATION: 97 % | BODY MASS INDEX: 33.05 KG/M2 | HEART RATE: 80 BPM | TEMPERATURE: 98 F | WEIGHT: 244 LBS | SYSTOLIC BLOOD PRESSURE: 122 MMHG | HEIGHT: 72 IN | DIASTOLIC BLOOD PRESSURE: 80 MMHG

## 2023-05-12 DIAGNOSIS — Z00.00 ROUTINE ADULT HEALTH MAINTENANCE: ICD-10-CM

## 2023-05-12 DIAGNOSIS — F33.0 MAJOR DEPRESSIVE DISORDER, RECURRENT EPISODE, MILD DEGREE: ICD-10-CM

## 2023-05-12 DIAGNOSIS — E66.9 OBESITY (BMI 30.0-34.9): ICD-10-CM

## 2023-05-12 DIAGNOSIS — H01.9 EYELID INFLAMMATION: Primary | ICD-10-CM

## 2023-05-12 DIAGNOSIS — R79.89 LOW TESTOSTERONE IN MALE: ICD-10-CM

## 2023-05-12 RX ORDER — BUPROPION HYDROCHLORIDE 300 MG/1
300 TABLET ORAL EVERY MORNING
Qty: 90 TABLET | Refills: 3 | Status: SHIPPED | OUTPATIENT
Start: 2023-05-12

## 2023-05-12 RX ORDER — ERYTHROMYCIN 5 MG/G
OINTMENT OPHTHALMIC NIGHTLY
Qty: 3.5 G | Refills: 1 | Status: SHIPPED | OUTPATIENT
Start: 2023-05-12

## 2023-05-12 RX ORDER — ERYTHROMYCIN 5 MG/G
OINTMENT OPHTHALMIC NIGHTLY
COMMUNITY
End: 2023-05-12 | Stop reason: SDUPTHER

## 2023-05-12 RX ORDER — PHENTERMINE HYDROCHLORIDE 37.5 MG/1
37.5 CAPSULE ORAL EVERY MORNING
Qty: 30 CAPSULE | OUTPATIENT
Start: 2023-05-12

## 2023-05-12 RX ORDER — PHENTERMINE HYDROCHLORIDE 37.5 MG/1
37.5 CAPSULE ORAL EVERY MORNING
Qty: 30 CAPSULE | Refills: 5 | Status: SHIPPED | OUTPATIENT
Start: 2023-05-12

## 2023-05-12 RX ORDER — TESTOSTERONE CYPIONATE 200 MG/ML
200 INJECTION, SOLUTION INTRAMUSCULAR WEEKLY
Qty: 4 ML | Refills: 5 | Status: SHIPPED | OUTPATIENT
Start: 2023-05-12

## 2023-05-12 NOTE — PROGRESS NOTES
"  Subjective   Jose Orta is a 42 y.o. male who is here for   Chief Complaint   Patient presents with   • Depression     Wellbutrin f/u    • Obesity     Would like to go back on phentermine    .   Answers for HPI/ROS submitted by the patient on 5/9/2023  What is the primary reason for your visit?: Other  Please describe your symptoms.: Follow up on Bupropion and eye medicine. Also want to look at getting Phentermine  Have you had these symptoms before?: Yes  How long have you been having these symptoms?: Greater than 2 weeks        Depression  Visit Type: follow-up  Patient presents with the following symptoms: anhedonia.  Compliance with medications:  %        Obesity    He is lost nearly 60 pounds over the last couple years.  Eating healthy  On the gym  No more alcohol  Doing well on phentermine    Depression also much improved.  He tried to wean himself off Wellbutrin but his depression came back.  Therefore we will restart today    Also has continued ongoing eyelash and stye infections.  He uses the erythromycin ophthalmic ointment on a as needed basis.  Also suggested using Dylan & Dylan baby shampoo to clean out eyelashes 1-2 times a week    Also doing very well on the testosterone injections.        The following portions of the patient's history were reviewed and updated as appropriate: allergies, current medications, past family history, past medical history, past social history, past surgical history and problem list.    Review of Systems    Objective   Vitals:    05/12/23 0846   BP: 122/80   BP Location: Left arm   Patient Position: Sitting   Cuff Size: Adult   Pulse: 80   Temp: 98 °F (36.7 °C)   SpO2: 97%   Weight: 111 kg (244 lb)   Height: 181.6 cm (71.5\")      Body mass index is 33.56 kg/m².    Physical Exam  Vitals reviewed.   Eyes:      Conjunctiva/sclera: Conjunctivae normal.   Cardiovascular:      Rate and Rhythm: Normal rate.   Pulmonary:      Effort: Pulmonary effort is normal. "   Neurological:      Mental Status: He is alert.   Psychiatric:         Mood and Affect: Mood normal.         Assessment & Plan   Diagnoses and all orders for this visit:    1. Eyelid inflammation (Primary)  -     erythromycin (ROMYCIN) 5 MG/GM ophthalmic ointment; Administer  to both eyes Every Night. Indications: Infection of the Conjunctiva of the Eye  Dispense: 3.5 g; Refill: 1    2. Major depressive disorder, recurrent episode, mild degree  -     buPROPion XL (Wellbutrin XL) 300 MG 24 hr tablet; Take 1 tablet by mouth Every Morning.  Dispense: 90 tablet; Refill: 3  -     CBC (No Diff); Future  -     Comprehensive Metabolic Panel; Future    3. Low testosterone in male  -     Testosterone Cypionate (DEPOTESTOTERONE CYPIONATE) 200 MG/ML injection; Inject 1 mL into the appropriate muscle as directed by prescriber 1 (One) Time Per Week.  Dispense: 4 mL; Refill: 5  -     Testosterone, Free, Total; Future    4. Obesity (BMI 30.0-34.9)  -     phentermine 37.5 MG capsule; Take 1 capsule by mouth Every Morning.  Dispense: 30 capsule; Refill: 5    5. Routine adult health maintenance  -     CBC (No Diff); Future  -     Comprehensive Metabolic Panel; Future  -     Lipid Panel; Future  -     Urinalysis With Microscopic If Indicated (No Culture) - Urine, Clean Catch; Future      There are no Patient Instructions on file for this visit.    Medications Discontinued During This Encounter   Medication Reason   • buPROPion XL (Wellbutrin XL) 300 MG 24 hr tablet Reorder   • phentermine 37.5 MG capsule Reorder   • Testosterone Cypionate (DEPOTESTOTERONE CYPIONATE) 200 MG/ML injection Reorder   • erythromycin (ROMYCIN) 5 MG/GM ophthalmic ointment Reorder        Return in about 6 months (around 11/12/2023) for Annual physical, controlled medication.    Dr. Jose Pham  [place]

## 2023-08-12 DIAGNOSIS — F41.0 PANIC ATTACK: ICD-10-CM

## 2023-08-12 RX ORDER — BUSPIRONE HYDROCHLORIDE 15 MG/1
TABLET ORAL
Qty: 90 TABLET | Refills: 5 | Status: SHIPPED | OUTPATIENT
Start: 2023-08-12

## 2023-11-15 ENCOUNTER — OFFICE VISIT (OUTPATIENT)
Dept: FAMILY MEDICINE CLINIC | Facility: CLINIC | Age: 43
End: 2023-11-15
Payer: COMMERCIAL

## 2023-11-15 VITALS
SYSTOLIC BLOOD PRESSURE: 140 MMHG | BODY MASS INDEX: 31.83 KG/M2 | TEMPERATURE: 97.5 F | HEIGHT: 72 IN | OXYGEN SATURATION: 97 % | HEART RATE: 91 BPM | WEIGHT: 235 LBS | DIASTOLIC BLOOD PRESSURE: 80 MMHG

## 2023-11-15 DIAGNOSIS — E66.9 OBESITY (BMI 30.0-34.9): ICD-10-CM

## 2023-11-15 DIAGNOSIS — Z00.00 ROUTINE ADULT HEALTH MAINTENANCE: Primary | ICD-10-CM

## 2023-11-15 DIAGNOSIS — R79.89 LOW TESTOSTERONE IN MALE: ICD-10-CM

## 2023-11-15 DIAGNOSIS — D58.2 ELEVATED HEMOGLOBIN: ICD-10-CM

## 2023-11-15 DIAGNOSIS — E16.2 LOW BLOOD SUGAR: ICD-10-CM

## 2023-11-15 PROCEDURE — 99396 PREV VISIT EST AGE 40-64: CPT | Performed by: FAMILY MEDICINE

## 2023-11-15 RX ORDER — TESTOSTERONE CYPIONATE 200 MG/ML
200 INJECTION, SOLUTION INTRAMUSCULAR WEEKLY
Qty: 4 ML | Refills: 5 | Status: SHIPPED | OUTPATIENT
Start: 2023-11-15

## 2023-11-15 RX ORDER — PHENTERMINE HYDROCHLORIDE 37.5 MG/1
37.5 CAPSULE ORAL EVERY MORNING
Qty: 30 CAPSULE | Refills: 5 | Status: SHIPPED | OUTPATIENT
Start: 2023-11-15

## 2023-11-15 NOTE — PROGRESS NOTES
"  Chief Complaint   Patient presents with    Annual Exam       Subjective   Jose Orta is a 43 y.o. male and is here for a yearly physical exam. The patient reports problems - continues to do well. Has lost 90 lbs. Stopped drinking 3 years ago .    Do you take any herbs or supplements that were not prescribed by a doctor? yes. If so, these will be added to active medication list.    The following portions of the patient's history were reviewed and updated as appropriate: allergies, current medications, past family history, past medical history, past social history, past surgical history, and problem list.    Social and Family and Surgical History reviewed and updated today, see Rooming tab.    Health History, Preventive Measures and Vaccination flow sheets reviewed and updated today.    Patient's current medical chart in Epic; including previous office notes, Mychart messages, recent phone calls, imaging, labs, specialist's evaluation either in notes or in Media tab reviewed today.    Other outside pertinent medical information also reviewed thru Care Everywhere function is also reviewed today.    Review of Systems  Review of Systems  Pertinent items are noted in HPI.    Vitals:    11/15/23 0830   BP: 140/80   Pulse: 91   Temp: 97.5 °F (36.4 °C)   SpO2: 97%   Weight: 107 kg (235 lb)   Height: 181.6 cm (71.5\")     Body mass index is 32.32 kg/m².      General Appearance:  Alert, cooperative, no distress, appears stated age   Head:  Normocephalic, without obvious abnormality, atraumatic   Eyes:  PERRL, conjunctiva/corneas clear, EOM's intact.   Ears:  Normal TM's and external ear canals, both ears   Nose: Nares normal, septum midline, mucosa normal, no drainage or sinus tenderness   Throat: Lips, mucosa, and tongue normal; teeth and gums normal   Neck: Supple, symmetrical, trachea midline, no adenopathy;   thyroid: No enlargement/tenderness/nodules; no carotid  bruit   Back:  Symmetric, no curvature, ROM normal, " no CVA tenderness   Lungs:  Clear to auscultation bilaterally, respirations unlabored   Chest wall:  No tenderness or deformity   Heart:  Regular rate and rhythm, S1 and S2 normal, no murmur, rub or gallop   Abdomen:  Soft, non-tender, bowel sounds active all four quadrants,   no masses, no organomegaly   Rectal:        Extremities: Extremities normal, atraumatic, no cyanosis or edema   Pulses: 2+ and symmetric all extremities   Skin: Skin color, texture, turgor normal, no rashes or lesions   Lymph nodes: Cervical, supraclavicular, and axillary nodes normal   Neurologic: CNII-XII intact. Normal strength, sensation.          Results for orders placed or performed in visit on 11/01/23   CBC (No Diff)    Specimen: Blood   Result Value Ref Range    WBC 7.47 3.40 - 10.80 10*3/mm3    RBC 5.85 (H) 4.14 - 5.80 10*6/mm3    Hemoglobin 17.8 (H) 13.0 - 17.7 g/dL    Hematocrit 52.0 (H) 37.5 - 51.0 %    MCV 88.9 79.0 - 97.0 fL    MCH 30.4 26.6 - 33.0 pg    MCHC 34.2 31.5 - 35.7 g/dL    RDW 12.6 12.3 - 15.4 %    Platelets 339 140 - 450 10*3/mm3   Comprehensive Metabolic Panel    Specimen: Blood   Result Value Ref Range    Glucose 63 (L) 65 - 99 mg/dL    BUN 12 6 - 20 mg/dL    Creatinine 1.11 0.76 - 1.27 mg/dL    EGFR Result 84.0 >60.0 mL/min/1.73    BUN/Creatinine Ratio 10.8 7.0 - 25.0    Sodium 141 136 - 145 mmol/L    Potassium 4.6 3.5 - 5.2 mmol/L    Chloride 101 98 - 107 mmol/L    Total CO2 26.2 22.0 - 29.0 mmol/L    Calcium 9.7 8.6 - 10.5 mg/dL    Total Protein 6.6 6.0 - 8.5 g/dL    Albumin 4.9 3.5 - 5.2 g/dL    Globulin 1.7 gm/dL    A/G Ratio 2.9 g/dL    Total Bilirubin 0.7 0.0 - 1.2 mg/dL    Alkaline Phosphatase 62 39 - 117 U/L    AST (SGOT) 17 1 - 40 U/L    ALT (SGPT) 20 1 - 41 U/L   Lipid Panel    Specimen: Blood   Result Value Ref Range    Total Cholesterol 129 0 - 200 mg/dL    Triglycerides 79 0 - 150 mg/dL    HDL Cholesterol 39 (L) 40 - 60 mg/dL    VLDL Cholesterol Tanner 16 5 - 40 mg/dL    LDL Chol Calc (NIH) 74 0 - 100  mg/dL   Testosterone, Free, Total    Specimen: Blood   Result Value Ref Range    Testosterone, Total 782 264 - 916 ng/dL    Testosterone, Free 19.3 6.8 - 21.5 pg/mL   Urinalysis With Microscopic If Indicated (No Culture) - Urine, Clean Catch    Specimen: Urine, Clean Catch   Result Value Ref Range    Specific Gravity, UA 1.008 1.005 - 1.030    pH, UA 7.5 5.0 - 8.0    Color, UA Yellow     Appearance, UA Clear Clear    Leukocytes, UA Negative Negative    Protein Negative Negative    Glucose, UA Negative Negative    Ketones Negative Negative    Blood, UA Negative Negative    Bilirubin, UA Negative Negative    Urobilinogen, UA Comment     Nitrite, UA Negative Negative     Assessment & Plan   Healthy male exam.  Diagnoses and all orders for this visit:    1. Routine adult health maintenance (Primary)    2. Obesity (BMI 30.0-34.9)  -     phentermine 37.5 MG capsule; Take 1 capsule by mouth Every Morning.  Dispense: 30 capsule; Refill: 5    3. Low testosterone in male  -     Testosterone Cypionate (DEPOTESTOTERONE CYPIONATE) 200 MG/ML injection; Inject 1 mL into the appropriate muscle as directed by prescriber 1 (One) Time Per Week.  Dispense: 4 mL; Refill: 5  -     Testosterone, Free, Total; Future  -     PSA DIAGNOSTIC; Future    4. Elevated hemoglobin  -     Hemoglobin & Hematocrit, Blood; Future    5. Low blood sugar  -     Hemoglobin A1c; Future      1. Labs reviewed  H/H up a bit due to depo T  2. Patient Counseling:  --Nutrition: Stressed importance of moderation in: sodium, caffeine, , saturated fat and cholesterol.  Discussed: caloric balance, sufficient intake of fresh fruits, vegetables, fiber, calcium, iron.  --Exercise: Stressed the importance of regular exercise.   --Substance Abuse: Discussed cessation and or reduction of tobacco, alcohol, or other drug use; driving or other dangerous activities under the influence.    --Dental health: Discussed importance of regular tooth brushing, flossing, and dental  visits.  --Suggested having eyes and vision checked if needed or past due.  --Immunizations reviewed and given if needed.  --Discussed benefits of screening colonoscopy and or ColoGuard.  3. Discussed the patient's BMI with him.  The BMI is above average; BMI management plan is completed  4. Follow up in 6 months    There are no Patient Instructions on file for this visit.    Medications Discontinued During This Encounter   Medication Reason    busPIRone (BUSPAR) 15 MG tablet *Therapy completed    erythromycin (ROMYCIN) 5 MG/GM ophthalmic ointment *Therapy completed    phentermine 37.5 MG capsule Reorder    Testosterone Cypionate (DEPOTESTOTERONE CYPIONATE) 200 MG/ML injection Reorder        Dr. Jose Pham MD  [place], [place]  [facility]

## 2024-05-09 DIAGNOSIS — R79.89 LOW TESTOSTERONE IN MALE: ICD-10-CM

## 2024-05-09 RX ORDER — TESTOSTERONE CYPIONATE 200 MG/ML
INJECTION, SOLUTION INTRAMUSCULAR
Qty: 4 ML | OUTPATIENT
Start: 2024-05-09

## 2024-05-10 ENCOUNTER — TELEPHONE (OUTPATIENT)
Dept: FAMILY MEDICINE CLINIC | Facility: CLINIC | Age: 44
End: 2024-05-10
Payer: COMMERCIAL

## 2024-05-10 DIAGNOSIS — R79.89 LOW TESTOSTERONE IN MALE: ICD-10-CM

## 2024-05-10 RX ORDER — TESTOSTERONE CYPIONATE 200 MG/ML
200 INJECTION, SOLUTION INTRAMUSCULAR WEEKLY
Qty: 4 ML | Refills: 5 | Status: SHIPPED | OUTPATIENT
Start: 2024-05-10

## 2024-05-15 ENCOUNTER — OFFICE VISIT (OUTPATIENT)
Dept: FAMILY MEDICINE CLINIC | Facility: CLINIC | Age: 44
End: 2024-05-15
Payer: COMMERCIAL

## 2024-05-15 VITALS
HEIGHT: 72 IN | SYSTOLIC BLOOD PRESSURE: 130 MMHG | TEMPERATURE: 98 F | DIASTOLIC BLOOD PRESSURE: 80 MMHG | WEIGHT: 237 LBS | OXYGEN SATURATION: 100 % | HEART RATE: 84 BPM | BODY MASS INDEX: 32.1 KG/M2

## 2024-05-15 DIAGNOSIS — M26.622 ARTHRALGIA OF LEFT TEMPOROMANDIBULAR JOINT: ICD-10-CM

## 2024-05-15 DIAGNOSIS — R79.89 LOW TESTOSTERONE IN MALE: Primary | ICD-10-CM

## 2024-05-15 DIAGNOSIS — F33.0 MAJOR DEPRESSIVE DISORDER, RECURRENT EPISODE, MILD DEGREE: ICD-10-CM

## 2024-05-15 PROCEDURE — 99214 OFFICE O/P EST MOD 30 MIN: CPT | Performed by: FAMILY MEDICINE

## 2024-05-15 RX ORDER — BUPROPION HYDROCHLORIDE 300 MG/1
300 TABLET ORAL EVERY MORNING
Qty: 90 TABLET | Refills: 3 | Status: SHIPPED | OUTPATIENT
Start: 2024-05-15

## 2024-05-15 NOTE — PROGRESS NOTES
"  Subjective   Jose Orta is a 43 y.o. male who is here for   Chief Complaint   Patient presents with    low testosterone      Med follow up     Jaw Pain     Left side x 1 month, has improved but still noticeable while eating or yawning. No popping    .   Answers submitted by the patient for this visit:  Primary Reason for Visit (Submitted on 5/11/2024)  What is the primary reason for your visit?: Other  Other (Submitted on 5/11/2024)  Please describe your symptoms.: Testosterone re up and I have been having a pain in my jaw that has been slowly improving over the last month. No injury or catalyst known. Never had wisdom teeth removed but unsure if that's related. Fyi  Have you had these symptoms before?: No  How long have you been having these symptoms?: Greater than 2 weeks    History of Present Illness     Doing well on the continued testosterone injections.  All previous symptoms are resolved.    He will likely taper off the phentermine.  Not getting much benefit at this time.  His maintaining his weight.    Depression well-controlled on Wellbutrin.    Had a sore left jaw for several weeks.  No injury.  No redness no rash no swelling no fever.  No evidence of ear or throat or tonsil infection.      The following portions of the patient's history were reviewed and updated as appropriate: allergies, current medications, past family history, past medical history, past social history, past surgical history, and problem list.    Review of Systems    Objective   Vitals:    05/15/24 0938   BP: 130/80   BP Location: Left arm   Patient Position: Sitting   Cuff Size: Adult   Pulse: 84   Temp: 98 °F (36.7 °C)   SpO2: 100%   Weight: 108 kg (237 lb)   Height: 181.6 cm (71.5\")      Physical Exam  HENT:      Left Ear: Tympanic membrane, ear canal and external ear normal.      Nose: Nose normal.      Mouth/Throat:      Pharynx: No oropharyngeal exudate or posterior oropharyngeal erythema.   Neck:      Vascular: No " carotid bruit.   Musculoskeletal:      Cervical back: Normal range of motion and neck supple. No rigidity or tenderness.   Lymphadenopathy:      Cervical: No cervical adenopathy.   Skin:     Findings: No rash.   Neurological:      Mental Status: He is alert.         Assessment & Plan   Diagnoses and all orders for this visit:    1. Low testosterone in male (Primary)    2. Major depressive disorder, recurrent episode, mild degree  -     buPROPion XL (Wellbutrin XL) 300 MG 24 hr tablet; Take 1 tablet by mouth Every Morning.  Dispense: 90 tablet; Refill: 3    3. Arthralgia of left temporomandibular joint    The left jaw pain is resolving on its own.  Exam unrevealing    Stop phentermine    Depo testosterone sent in last week    Labs today    Patient has been prescribed controlled medications.  Treatment discussed  LAVELLE updated and reviewed.  PDMP also reviewed and marked as reviewed.  Risk and Benefits discussed.  Per [place]HB1.    There are no Patient Instructions on file for this visit.    Medications Discontinued During This Encounter   Medication Reason    buPROPion XL (Wellbutrin XL) 300 MG 24 hr tablet Reorder    phentermine 37.5 MG capsule *Therapy completed        No follow-ups on file.    Dr. Jose Pham  [facility]  [place], [place]

## 2024-08-23 DIAGNOSIS — E66.9 OBESITY (BMI 30.0-34.9): ICD-10-CM

## 2024-08-23 RX ORDER — PHENTERMINE HYDROCHLORIDE 37.5 MG/1
37.5 CAPSULE ORAL EVERY MORNING
Qty: 30 CAPSULE | OUTPATIENT
Start: 2024-08-23

## 2024-08-29 ENCOUNTER — TELEPHONE (OUTPATIENT)
Dept: FAMILY MEDICINE CLINIC | Facility: CLINIC | Age: 44
End: 2024-08-29

## 2024-08-29 DIAGNOSIS — E66.9 OBESITY (BMI 30.0-34.9): Primary | ICD-10-CM

## 2024-08-30 RX ORDER — PHENTERMINE HYDROCHLORIDE 37.5 MG/1
37.5 TABLET ORAL
Qty: 90 TABLET | Refills: 0 | Status: SHIPPED | OUTPATIENT
Start: 2024-08-30

## 2024-09-25 DIAGNOSIS — R79.89 LOW TESTOSTERONE IN MALE: ICD-10-CM

## 2024-09-25 RX ORDER — NEEDLES, FILTER 19GX1 1/2"
NEEDLE, DISPOSABLE MISCELLANEOUS
Qty: 25 EACH | Refills: 1 | Status: SHIPPED | OUTPATIENT
Start: 2024-09-25

## 2024-10-20 DIAGNOSIS — R79.89 LOW TESTOSTERONE IN MALE: ICD-10-CM

## 2024-10-20 RX ORDER — TESTOSTERONE CYPIONATE 200 MG/ML
INJECTION, SOLUTION INTRAMUSCULAR
Qty: 4 ML | Refills: 5 | Status: SHIPPED | OUTPATIENT
Start: 2024-10-20

## 2024-11-13 ENCOUNTER — OFFICE VISIT (OUTPATIENT)
Dept: FAMILY MEDICINE CLINIC | Facility: CLINIC | Age: 44
End: 2024-11-13
Payer: COMMERCIAL

## 2024-11-13 VITALS
WEIGHT: 239 LBS | DIASTOLIC BLOOD PRESSURE: 86 MMHG | HEART RATE: 90 BPM | TEMPERATURE: 98.1 F | HEIGHT: 72 IN | OXYGEN SATURATION: 97 % | BODY MASS INDEX: 32.37 KG/M2 | SYSTOLIC BLOOD PRESSURE: 130 MMHG

## 2024-11-13 DIAGNOSIS — Z12.5 SCREENING FOR PROSTATE CANCER: ICD-10-CM

## 2024-11-13 DIAGNOSIS — R79.89 LOW TESTOSTERONE IN MALE: Primary | ICD-10-CM

## 2024-11-13 DIAGNOSIS — Z00.00 ROUTINE ADULT HEALTH MAINTENANCE: ICD-10-CM

## 2024-11-13 DIAGNOSIS — F32.A ANXIETY AND DEPRESSION: ICD-10-CM

## 2024-11-13 DIAGNOSIS — Z79.890 LONG-TERM CURRENT USE OF TESTOSTERONE CYPIONATE: ICD-10-CM

## 2024-11-13 DIAGNOSIS — F41.9 ANXIETY AND DEPRESSION: ICD-10-CM

## 2024-11-13 PROCEDURE — 99214 OFFICE O/P EST MOD 30 MIN: CPT | Performed by: FAMILY MEDICINE

## 2024-11-13 NOTE — PROGRESS NOTES
"  Subjective   Jose Orta is a 44 y.o. male who is here for   Chief Complaint   Patient presents with    Depression    low testosterone    Fatigue   .   Answers submitted by the patient for this visit:  Other (Submitted on 11/13/2024)  Please describe your symptoms.: Check up  Have you had these symptoms before?: No  How long have you been having these symptoms?: 1-4 days  Please list any medications you are currently taking for this condition.: Buoropian, Phentermine  Please describe any probable cause for these symptoms. : Na  Primary Reason for Visit (Submitted on 11/13/2024)  What is the primary reason for your visit?: Problem Not Listed    History of Present Illness     Jose reports having some extra fatigue lately.  Gets very sleepy and drowsy while at work.  Nods off at work.  Gets sleepy also driving long distances.  We discussed sleep apnea and snoring.  No reports of this currently.  When he was much heavier he did have sleep apnea.    Anxiety pression well-controlled with bupropion    Still exercising and jogging for exercise    Thinks the phentermine on most days for continued weight loss and maintenance    Still taking his testosterone      The following portions of the patient's history were reviewed and updated as appropriate: allergies, current medications, past family history, past medical history, past social history, past surgical history, and problem list.    Review of Systems    Objective   Vitals:    11/13/24 0856   BP: 130/86   BP Location: Left arm   Patient Position: Sitting   Cuff Size: Adult   Pulse: 90   Temp: 98.1 °F (36.7 °C)   SpO2: 97%   Weight: 108 kg (239 lb)   Height: 181.6 cm (71.5\")      Physical Exam  Vitals reviewed.   Cardiovascular:      Rate and Rhythm: Normal rate.   Pulmonary:      Effort: Pulmonary effort is normal.   Neurological:      Mental Status: He is alert.   Psychiatric:         Mood and Affect: Mood normal.       Assessment & Plan   Diagnoses and all " orders for this visit:    1. Low testosterone in male (Primary)  -     Testosterone, Free, Total; Future    2. Long-term current use of testosterone cypionate  -     Compliance Drug Analysis, Ur - Urine, Clean Catch; Future    3. Screening for prostate cancer  -     PSA Screen; Future    4. Routine adult health maintenance  -     CBC (No Diff); Future  -     Comprehensive Metabolic Panel; Future  -     Lipid Panel; Future  -     Urinalysis With Microscopic If Indicated (No Culture) - Urine, Clean Catch; Future  -     TSH Rfx On Abnormal To Free T4; Future  -     Compliance Drug Analysis, Ur - Urine, Clean Catch; Future    5. Anxiety and depression    Patient has been prescribed controlled medications.  Treatment discussed  LAVELLE updated and reviewed.  PDMP also reviewed and marked as reviewed.  Risk and Benefits discussed.  Per [place]HB1.    There are no Patient Instructions on file for this visit.    There are no discontinued medications.     No follow-ups on file.    Dr. Jose Pham  [place], [place]

## 2024-11-18 DIAGNOSIS — F33.0 MAJOR DEPRESSIVE DISORDER, RECURRENT EPISODE, MILD DEGREE: ICD-10-CM

## 2024-11-18 DIAGNOSIS — R79.89 LOW TESTOSTERONE IN MALE: ICD-10-CM

## 2024-11-18 DIAGNOSIS — E66.811 OBESITY (BMI 30.0-34.9): ICD-10-CM

## 2024-11-18 RX ORDER — PHENTERMINE HYDROCHLORIDE 37.5 MG/1
37.5 TABLET ORAL
Qty: 90 TABLET | Refills: 0 | OUTPATIENT
Start: 2024-11-18

## 2024-11-18 RX ORDER — TESTOSTERONE CYPIONATE 200 MG/ML
INJECTION, SOLUTION INTRAMUSCULAR
Qty: 4 ML | Refills: 5 | OUTPATIENT
Start: 2024-11-18

## 2024-11-18 RX ORDER — BUPROPION HYDROCHLORIDE 300 MG/1
300 TABLET ORAL EVERY MORNING
Qty: 90 TABLET | Refills: 3 | OUTPATIENT
Start: 2024-11-18

## 2024-11-18 NOTE — TELEPHONE ENCOUNTER
Caller: Jose Orta    Relationship: Self    Best call back number: [phone]     Requested Prescriptions:   Requested Prescriptions     Pending Prescriptions Disp Refills    Testosterone Cypionate (DEPOTESTOTERONE CYPIONATE) 200 MG/ML injection 4 mL 5    phentermine (Adipex-P) 37.5 MG tablet 90 tablet 0     Sig: Take 1 tablet by mouth Every Morning Before Breakfast. Indications: OBESITY    buPROPion XL (Wellbutrin XL) 300 MG 24 hr tablet 90 tablet 3     Sig: Take 1 tablet by mouth Every Morning.        Pharmacy where request should be sent: [id number] [place] [address] [phone] [facility] [phone]      Last office visit with prescribing clinician: 11/13/2024   Last telemedicine visit with prescribing clinician: Visit date not found   Next office visit with prescribing clinician: 5/20/2025         Does the patient have less than a 3 day supply:  [] Yes  [x] No

## 2024-11-19 NOTE — TELEPHONE ENCOUNTER
L/M on pharmacy VM.  Refills available on Testosterone and syringes.  Too early for refill on Phentermine.

## 2024-11-21 DIAGNOSIS — F33.0 MAJOR DEPRESSIVE DISORDER, RECURRENT EPISODE, MILD DEGREE: ICD-10-CM

## 2024-11-21 RX ORDER — BUPROPION HYDROCHLORIDE 300 MG/1
300 TABLET ORAL EVERY MORNING
Qty: 90 TABLET | Refills: 3 | Status: SHIPPED | OUTPATIENT
Start: 2024-11-21

## 2024-11-21 NOTE — TELEPHONE ENCOUNTER
Caller: You Jose QUINTON    Relationship: Self    Best call back number: [phone]     Requested Prescriptions:   Requested Prescriptions     Pending Prescriptions Disp Refills    buPROPion XL (Wellbutrin XL) 300 MG 24 hr tablet 90 tablet 3     Sig: Take 1 tablet by mouth Every Morning.        Pharmacy where request should be sent: [facility] [address] [place] [address] [id number] [place] [address] [phone] [facility] [phone]      Last office visit with prescribing clinician: 11/13/2024   Last telemedicine visit with prescribing clinician: Visit date not found   Next office visit with prescribing clinician: 5/20/2025     Additional details provided by patient: PATIENT IS ONLY NEEDING REFILL ON THE MEDICATION ABOVE, BUT WOULD LIKE TO TRANSFER ALL MEDICATIONS TO NEW PHARMACY LISTED ABOVE.      HE IS DOWN TO HIS LAST WEEK OF MEDICATION AND IS REQUESTING A REFILL BE SENT OVER.     Does the patient have less than a 3 day supply:  [] Yes  [x] No    Would you like a call back once the refill request has been completed: [] Yes [x] No    If the office needs to give you a call back, can they leave a voicemail: [x] Yes [] No    Rommel Leiva Rep   11/21/24 09:33 EST

## 2024-11-25 NOTE — OUTREACH NOTE
Call Center TCM Note      Responses   [facility] patient discharged from?  Non-BH   Does the patient have one of the following disease processes/diagnoses(primary or secondary)?  Other   TCM attempt successful?  No   Unsuccessful attempts  Attempt 1          Yolie Salgado MA    11/30/2020, 17:05 EST       Intact

## 2024-12-02 DIAGNOSIS — E66.811 OBESITY (BMI 30.0-34.9): ICD-10-CM

## 2024-12-02 DIAGNOSIS — R79.89 LOW TESTOSTERONE IN MALE: ICD-10-CM

## 2024-12-02 RX ORDER — TESTOSTERONE CYPIONATE 200 MG/ML
200 INJECTION, SOLUTION INTRAMUSCULAR
Qty: 4 ML | Refills: 5 | Status: SHIPPED | OUTPATIENT
Start: 2024-12-02

## 2024-12-02 RX ORDER — PHENTERMINE HYDROCHLORIDE 37.5 MG/1
37.5 TABLET ORAL
Qty: 90 TABLET | Refills: 0 | Status: SHIPPED | OUTPATIENT
Start: 2024-12-02

## 2025-03-17 DIAGNOSIS — E66.811 OBESITY (BMI 30.0-34.9): ICD-10-CM

## 2025-03-17 RX ORDER — PHENTERMINE HYDROCHLORIDE 37.5 MG/1
TABLET ORAL
Qty: 90 TABLET | Refills: 0 | Status: SHIPPED | OUTPATIENT
Start: 2025-03-17

## 2025-03-17 NOTE — TELEPHONE ENCOUNTER
LV-11/13/24  NV-5/20/25  LF-12/2/24  UDS-11/13/24  CONTRACT-None on file    Please advise for refill.

## 2025-05-22 ENCOUNTER — PATIENT MESSAGE (OUTPATIENT)
Dept: FAMILY MEDICINE CLINIC | Facility: CLINIC | Age: 45
End: 2025-05-22
Payer: COMMERCIAL

## 2025-05-22 DIAGNOSIS — R79.89 LOW TESTOSTERONE IN MALE: ICD-10-CM

## 2025-05-24 RX ORDER — TESTOSTERONE CYPIONATE 200 MG/ML
200 INJECTION, SOLUTION INTRAMUSCULAR
Qty: 4 ML | Refills: 5 | Status: SHIPPED | OUTPATIENT
Start: 2025-05-24